# Patient Record
Sex: MALE | Race: OTHER
[De-identification: names, ages, dates, MRNs, and addresses within clinical notes are randomized per-mention and may not be internally consistent; named-entity substitution may affect disease eponyms.]

---

## 2017-08-05 ENCOUNTER — HOSPITAL ENCOUNTER (EMERGENCY)
Dept: HOSPITAL 15 - ER | Age: 52
Discharge: LEFT BEFORE DISCHARGE | End: 2017-08-05
Payer: MEDICAID

## 2017-08-05 VITALS — WEIGHT: 185 LBS | HEIGHT: 65 IN | BODY MASS INDEX: 30.82 KG/M2

## 2017-08-05 VITALS — SYSTOLIC BLOOD PRESSURE: 143 MMHG | DIASTOLIC BLOOD PRESSURE: 90 MMHG

## 2017-08-05 DIAGNOSIS — E11.65: ICD-10-CM

## 2017-08-05 DIAGNOSIS — I25.10: Primary | ICD-10-CM

## 2017-08-05 DIAGNOSIS — I10: ICD-10-CM

## 2017-08-05 LAB
ALBUMIN SERPL-MCNC: 3.3 G/DL (ref 3.4–5)
ALP SERPL-CCNC: 154 U/L (ref 45–117)
ANION GAP SERPL CALCULATED.3IONS-SCNC: 10 MMOL/L (ref 5–15)
BILIRUB SERPL-MCNC: 0.3 MG/DL (ref 0.2–1)
BUN SERPL-MCNC: 13 MG/DL (ref 7–18)
BUN/CREAT SERPL: 17.1
CALCIUM SERPL-MCNC: 8.9 MG/DL (ref 8.5–10.1)
CELLS COUNTED: 100
CHLORIDE SERPL-SCNC: 106 MMOL/L (ref 98–107)
CO2 SERPL-SCNC: 19 MMOL/L (ref 21–32)
GLUCOSE SERPL-MCNC: 160 MG/DL (ref 74–106)
HCT VFR BLD AUTO: 40.2 % (ref 41–53)
HGB BLD-MCNC: 13.9 G/DL (ref 13.5–17.5)
MAGNESIUM SERPL-MCNC: 2.2 MG/DL (ref 1.6–2.6)
MCH RBC QN AUTO: 30.3 PG (ref 28–32)
MCV RBC AUTO: 87.4 FL (ref 80–100)
POTASSIUM SERPL-SCNC: 4.2 MMOL/L (ref 3.5–5.1)
PROT SERPL-MCNC: 8.8 G/DL (ref 6.4–8.2)
SODIUM SERPL-SCNC: 135 MMOL/L (ref 136–145)
SUSPECT: (no result)

## 2017-08-05 PROCEDURE — 84484 ASSAY OF TROPONIN QUANT: CPT

## 2017-08-05 PROCEDURE — 71020: CPT

## 2017-08-05 PROCEDURE — 83690 ASSAY OF LIPASE: CPT

## 2017-08-05 PROCEDURE — 83735 ASSAY OF MAGNESIUM: CPT

## 2017-08-05 PROCEDURE — 85007 BL SMEAR W/DIFF WBC COUNT: CPT

## 2017-08-05 PROCEDURE — 94761 N-INVAS EAR/PLS OXIMETRY MLT: CPT

## 2017-08-05 PROCEDURE — 93005 ELECTROCARDIOGRAM TRACING: CPT

## 2017-08-05 PROCEDURE — 85027 COMPLETE CBC AUTOMATED: CPT

## 2017-08-05 PROCEDURE — 36415 COLL VENOUS BLD VENIPUNCTURE: CPT

## 2017-08-05 PROCEDURE — 80053 COMPREHEN METABOLIC PANEL: CPT

## 2017-08-05 PROCEDURE — 84443 ASSAY THYROID STIM HORMONE: CPT

## 2019-04-15 ENCOUNTER — HOSPITAL ENCOUNTER (EMERGENCY)
Dept: HOSPITAL 15 - ER | Age: 54
Discharge: HOME | End: 2019-04-15
Payer: COMMERCIAL

## 2019-04-15 VITALS — BODY MASS INDEX: 34.99 KG/M2 | HEIGHT: 65 IN | WEIGHT: 210 LBS

## 2019-04-15 VITALS — DIASTOLIC BLOOD PRESSURE: 75 MMHG | SYSTOLIC BLOOD PRESSURE: 127 MMHG

## 2019-04-15 DIAGNOSIS — M79.18: ICD-10-CM

## 2019-04-15 DIAGNOSIS — I10: ICD-10-CM

## 2019-04-15 DIAGNOSIS — E11.9: ICD-10-CM

## 2019-04-15 DIAGNOSIS — R10.11: Primary | ICD-10-CM

## 2019-04-15 LAB
ALBUMIN SERPL-MCNC: 3.6 G/DL (ref 3.4–5)
ALP SERPL-CCNC: 103 U/L (ref 45–117)
ALT SERPL-CCNC: 25 U/L (ref 16–61)
ANION GAP SERPL CALCULATED.3IONS-SCNC: 8 MMOL/L (ref 5–15)
BILIRUB SERPL-MCNC: 0.3 MG/DL (ref 0.2–1)
BUN SERPL-MCNC: 11 MG/DL (ref 7–18)
BUN/CREAT SERPL: 14.1
CALCIUM SERPL-MCNC: 9 MG/DL (ref 8.5–10.1)
CHLORIDE SERPL-SCNC: 103 MMOL/L (ref 98–107)
CO2 SERPL-SCNC: 25 MMOL/L (ref 21–32)
GLUCOSE SERPL-MCNC: 182 MG/DL (ref 74–106)
HCT VFR BLD AUTO: 40.5 % (ref 41–53)
HGB BLD-MCNC: 13.4 G/DL (ref 13.5–17.5)
MCH RBC QN AUTO: 28.5 PG (ref 28–32)
MCV RBC AUTO: 85.9 FL (ref 80–100)
NRBC BLD QL AUTO: 0.1 %
POTASSIUM SERPL-SCNC: 3.5 MMOL/L (ref 3.5–5.1)
PROT SERPL-MCNC: 7.8 G/DL (ref 6.4–8.2)
SODIUM SERPL-SCNC: 136 MMOL/L (ref 136–145)

## 2019-04-15 PROCEDURE — 81001 URINALYSIS AUTO W/SCOPE: CPT

## 2019-04-15 PROCEDURE — 84484 ASSAY OF TROPONIN QUANT: CPT

## 2019-04-15 PROCEDURE — 36415 COLL VENOUS BLD VENIPUNCTURE: CPT

## 2019-04-15 PROCEDURE — 85025 COMPLETE CBC W/AUTO DIFF WBC: CPT

## 2019-04-15 PROCEDURE — 80053 COMPREHEN METABOLIC PANEL: CPT

## 2019-04-15 PROCEDURE — 74176 CT ABD & PELVIS W/O CONTRAST: CPT

## 2019-04-15 PROCEDURE — 93005 ELECTROCARDIOGRAM TRACING: CPT

## 2019-08-24 ENCOUNTER — HOSPITAL ENCOUNTER (EMERGENCY)
Dept: HOSPITAL 15 - ER | Age: 54
LOS: 1 days | Discharge: LEFT BEFORE DISCHARGE | End: 2019-08-25
Payer: COMMERCIAL

## 2019-08-24 VITALS — HEIGHT: 65 IN | WEIGHT: 210 LBS | BODY MASS INDEX: 34.99 KG/M2

## 2019-08-24 VITALS — DIASTOLIC BLOOD PRESSURE: 57 MMHG | SYSTOLIC BLOOD PRESSURE: 118 MMHG

## 2019-08-24 DIAGNOSIS — R07.89: Primary | ICD-10-CM

## 2019-08-24 DIAGNOSIS — F41.9: ICD-10-CM

## 2019-08-24 DIAGNOSIS — Z53.21: ICD-10-CM

## 2019-08-24 PROCEDURE — 93005 ELECTROCARDIOGRAM TRACING: CPT

## 2020-01-09 ENCOUNTER — HOSPITAL ENCOUNTER (EMERGENCY)
Dept: HOSPITAL 15 - ER | Age: 55
Discharge: HOME | End: 2020-01-09
Payer: COMMERCIAL

## 2020-01-09 VITALS — HEIGHT: 65 IN | WEIGHT: 211 LBS | BODY MASS INDEX: 35.16 KG/M2

## 2020-01-09 VITALS — SYSTOLIC BLOOD PRESSURE: 144 MMHG | DIASTOLIC BLOOD PRESSURE: 71 MMHG

## 2020-01-09 DIAGNOSIS — M54.42: Primary | ICD-10-CM

## 2020-01-09 DIAGNOSIS — E11.9: ICD-10-CM

## 2020-01-09 DIAGNOSIS — I10: ICD-10-CM

## 2020-01-09 PROCEDURE — 99283 EMERGENCY DEPT VISIT LOW MDM: CPT

## 2020-01-09 PROCEDURE — 96372 THER/PROPH/DIAG INJ SC/IM: CPT

## 2020-01-09 PROCEDURE — 82962 GLUCOSE BLOOD TEST: CPT

## 2020-01-09 PROCEDURE — 73502 X-RAY EXAM HIP UNI 2-3 VIEWS: CPT

## 2020-01-12 ENCOUNTER — HOSPITAL ENCOUNTER (EMERGENCY)
Dept: HOSPITAL 15 - ER | Age: 55
Discharge: HOME | End: 2020-01-12
Payer: COMMERCIAL

## 2020-01-12 VITALS — DIASTOLIC BLOOD PRESSURE: 69 MMHG | SYSTOLIC BLOOD PRESSURE: 136 MMHG

## 2020-01-12 VITALS — WEIGHT: 211 LBS | BODY MASS INDEX: 35.16 KG/M2 | HEIGHT: 65 IN

## 2020-01-12 DIAGNOSIS — E11.9: ICD-10-CM

## 2020-01-12 DIAGNOSIS — I10: ICD-10-CM

## 2020-01-12 DIAGNOSIS — M54.16: Primary | ICD-10-CM

## 2021-08-13 ENCOUNTER — HOSPITAL ENCOUNTER (EMERGENCY)
Dept: HOSPITAL 15 - ER | Age: 56
Discharge: HOME | End: 2021-08-13
Payer: COMMERCIAL

## 2021-08-13 VITALS — SYSTOLIC BLOOD PRESSURE: 136 MMHG | DIASTOLIC BLOOD PRESSURE: 63 MMHG

## 2021-08-13 VITALS — WEIGHT: 210 LBS | BODY MASS INDEX: 34.99 KG/M2 | HEIGHT: 65 IN
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
## 2021-08-13 DIAGNOSIS — E11.9: ICD-10-CM

## 2021-08-13 DIAGNOSIS — I10: ICD-10-CM
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
## 2021-08-13 DIAGNOSIS — Z90.49: ICD-10-CM

## 2021-08-13 DIAGNOSIS — S92.341D: ICD-10-CM
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
## 2021-08-13 DIAGNOSIS — Z79.84: ICD-10-CM

## 2021-08-13 DIAGNOSIS — Z79.899: ICD-10-CM

## 2021-08-13 DIAGNOSIS — S90.32XA: Primary | ICD-10-CM

## 2021-08-13 PROCEDURE — 73620 X-RAY EXAM OF FOOT: CPT

## 2021-10-17 ENCOUNTER — HOSPITAL ENCOUNTER (EMERGENCY)
Dept: HOSPITAL 15 - ER | Age: 56
Discharge: HOME | End: 2021-10-17
Payer: COMMERCIAL

## 2021-10-17 VITALS — DIASTOLIC BLOOD PRESSURE: 70 MMHG | SYSTOLIC BLOOD PRESSURE: 126 MMHG

## 2021-10-17 VITALS — HEIGHT: 65 IN | WEIGHT: 205 LBS | BODY MASS INDEX: 34.16 KG/M2

## 2021-10-17 DIAGNOSIS — Z90.49: ICD-10-CM

## 2021-10-17 DIAGNOSIS — M79.671: ICD-10-CM

## 2021-10-17 DIAGNOSIS — E66.9: ICD-10-CM
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
## 2021-10-17 DIAGNOSIS — Z79.899: ICD-10-CM

## 2021-10-17 DIAGNOSIS — I10: ICD-10-CM
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
## 2021-10-17 DIAGNOSIS — E11.9: ICD-10-CM
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
## 2021-10-17 DIAGNOSIS — S92.334A: Primary | ICD-10-CM

## 2021-10-17 PROCEDURE — 73630 X-RAY EXAM OF FOOT: CPT

## 2022-08-18 ENCOUNTER — HOSPITAL ENCOUNTER (EMERGENCY)
Dept: HOSPITAL 15 - ER | Age: 57
Discharge: LEFT BEFORE DISCHARGE | End: 2022-08-18
Payer: COMMERCIAL

## 2022-08-18 VITALS — HEIGHT: 65 IN | BODY MASS INDEX: 30.85 KG/M2 | WEIGHT: 185.19 LBS

## 2022-08-18 VITALS — DIASTOLIC BLOOD PRESSURE: 78 MMHG | SYSTOLIC BLOOD PRESSURE: 151 MMHG

## 2022-08-18 DIAGNOSIS — Z90.49: ICD-10-CM

## 2022-08-18 DIAGNOSIS — R10.84: Primary | ICD-10-CM

## 2022-08-18 DIAGNOSIS — E11.9: ICD-10-CM

## 2022-08-18 DIAGNOSIS — I10: ICD-10-CM

## 2022-08-18 LAB
ALBUMIN SERPL-MCNC: 3.8 G/DL (ref 3.4–5)
ALP SERPL-CCNC: 82 U/L (ref 45–117)
ALT SERPL-CCNC: 22 U/L (ref 16–61)
AMYLASE SERPL-CCNC: 62 U/L (ref 25–115)
ANION GAP SERPL CALCULATED.3IONS-SCNC: 9 MMOL/L (ref 5–15)
BILIRUB SERPL-MCNC: 0.6 MG/DL (ref 0.2–1)
BUN SERPL-MCNC: 22 MG/DL (ref 7–18)
BUN/CREAT SERPL: 14.5
CALCIUM SERPL-MCNC: 9.8 MG/DL (ref 8.5–10.1)
CHLORIDE SERPL-SCNC: 105 MMOL/L (ref 98–107)
CO2 SERPL-SCNC: 21 MMOL/L (ref 21–32)
GLUCOSE SERPL-MCNC: 237 MG/DL (ref 74–106)
HCT VFR BLD AUTO: 41.2 % (ref 41–53)
HGB BLD-MCNC: 13.3 G/DL (ref 13.5–17.5)
LIPASE SERPL-CCNC: 96 U/L (ref 73–393)
MCH RBC QN AUTO: 28.1 PG (ref 28–32)
MCV RBC AUTO: 87 FL (ref 80–100)
NRBC BLD QL AUTO: 0 %
POTASSIUM SERPL-SCNC: 4.4 MMOL/L (ref 3.5–5.1)
PROT SERPL-MCNC: 7.8 G/DL (ref 6.4–8.2)
SODIUM SERPL-SCNC: 135 MMOL/L (ref 136–145)

## 2022-08-18 PROCEDURE — 85025 COMPLETE CBC W/AUTO DIFF WBC: CPT

## 2022-08-18 PROCEDURE — 36415 COLL VENOUS BLD VENIPUNCTURE: CPT

## 2022-08-18 PROCEDURE — 74176 CT ABD & PELVIS W/O CONTRAST: CPT

## 2022-08-18 PROCEDURE — 80053 COMPREHEN METABOLIC PANEL: CPT

## 2022-08-18 PROCEDURE — 93005 ELECTROCARDIOGRAM TRACING: CPT

## 2022-08-18 PROCEDURE — 82150 ASSAY OF AMYLASE: CPT

## 2022-08-18 PROCEDURE — 83690 ASSAY OF LIPASE: CPT

## 2023-05-13 ENCOUNTER — HOSPITAL ENCOUNTER (EMERGENCY)
Dept: HOSPITAL 15 - ER | Age: 58
Discharge: HOME | End: 2023-05-13
Payer: COMMERCIAL

## 2023-05-13 VITALS — SYSTOLIC BLOOD PRESSURE: 132 MMHG | DIASTOLIC BLOOD PRESSURE: 46 MMHG

## 2023-05-13 VITALS — WEIGHT: 184.53 LBS | BODY MASS INDEX: 30.74 KG/M2 | HEIGHT: 65 IN

## 2023-05-13 DIAGNOSIS — E11.9: ICD-10-CM

## 2023-05-13 DIAGNOSIS — R07.81: Primary | ICD-10-CM

## 2023-05-13 DIAGNOSIS — I10: ICD-10-CM
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
## 2023-05-13 DIAGNOSIS — Z93.3: ICD-10-CM
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
## 2023-05-13 DIAGNOSIS — Z90.49: ICD-10-CM

## 2023-05-13 DIAGNOSIS — R06.02: ICD-10-CM

## 2023-05-13 LAB
ALBUMIN SERPL-MCNC: 3.7 G/DL (ref 3.4–5)
ALP SERPL-CCNC: 68 U/L (ref 45–117)
ALT SERPL-CCNC: 20 U/L (ref 16–61)
ANION GAP SERPL CALCULATED.3IONS-SCNC: 5 MMOL/L (ref 5–15)
BILIRUB SERPL-MCNC: 0.6 MG/DL (ref 0.2–1)
BUN SERPL-MCNC: 15 MG/DL (ref 7–18)
BUN/CREAT SERPL: 18.5 (ref 10–20)
CALCIUM SERPL-MCNC: 9 MG/DL (ref 8.5–10.1)
CHLORIDE SERPL-SCNC: 108 MMOL/L (ref 98–107)
CO2 SERPL-SCNC: 24 MMOL/L (ref 21–32)
GLUCOSE SERPL-MCNC: 125 MG/DL (ref 74–106)
HCT VFR BLD AUTO: 38.9 % (ref 41–53)
HGB BLD-MCNC: 13 G/DL (ref 13.5–17.5)
MCH RBC QN AUTO: 28.8 PG (ref 28–32)
MCV RBC AUTO: 86.4 FL (ref 80–100)
NRBC BLD QL AUTO: 0.3 %
POTASSIUM SERPL-SCNC: 3.6 MMOL/L (ref 3.5–5.1)
PROT SERPL-MCNC: 7.3 G/DL (ref 6.4–8.2)
SODIUM SERPL-SCNC: 137 MMOL/L (ref 136–145)

## 2023-05-13 PROCEDURE — 74176 CT ABD & PELVIS W/O CONTRAST: CPT

## 2023-05-13 PROCEDURE — 72125 CT NECK SPINE W/O DYE: CPT

## 2023-05-13 PROCEDURE — 71250 CT THORAX DX C-: CPT

## 2023-05-13 PROCEDURE — 36415 COLL VENOUS BLD VENIPUNCTURE: CPT

## 2023-05-13 PROCEDURE — 70450 CT HEAD/BRAIN W/O DYE: CPT

## 2023-05-13 PROCEDURE — 85025 COMPLETE CBC W/AUTO DIFF WBC: CPT

## 2023-05-13 PROCEDURE — 80053 COMPREHEN METABOLIC PANEL: CPT

## 2023-05-13 PROCEDURE — 82962 GLUCOSE BLOOD TEST: CPT

## 2023-05-13 PROCEDURE — 81001 URINALYSIS AUTO W/SCOPE: CPT

## 2023-07-02 ENCOUNTER — HOSPITAL ENCOUNTER (EMERGENCY)
Dept: HOSPITAL 15 - ER | Age: 58
Discharge: LEFT BEFORE DISCHARGE | End: 2023-07-02
Payer: COMMERCIAL

## 2023-07-02 VITALS — HEIGHT: 65 IN | WEIGHT: 187.39 LBS | BODY MASS INDEX: 31.22 KG/M2

## 2023-07-02 VITALS — DIASTOLIC BLOOD PRESSURE: 67 MMHG | SYSTOLIC BLOOD PRESSURE: 137 MMHG

## 2023-07-02 DIAGNOSIS — R06.02: ICD-10-CM

## 2023-07-02 DIAGNOSIS — Z79.899: ICD-10-CM

## 2023-07-02 DIAGNOSIS — Z79.01: ICD-10-CM

## 2023-07-02 DIAGNOSIS — Z53.21: ICD-10-CM

## 2023-07-02 DIAGNOSIS — R07.89: Primary | ICD-10-CM

## 2023-07-02 LAB
ALBUMIN SERPL-MCNC: 3.9 G/DL (ref 3.4–5)
ALCOHOL, URINE: < 3 MG/DL (ref 0–10)
ALP SERPL-CCNC: 81 U/L (ref 45–117)
ALT SERPL-CCNC: 22 U/L (ref 16–61)
AMPHETAMINES UR QL SCN: NEGATIVE
ANION GAP SERPL CALCULATED.3IONS-SCNC: 10 MMOL/L (ref 5–15)
APTT PPP: 30.5 SEC (ref 24.5–34.5)
BARBITURATES UR QL SCN: NEGATIVE
BENZODIAZ UR QL SCN: NEGATIVE
BILIRUB SERPL-MCNC: 0.6 MG/DL (ref 0.2–1)
BUN SERPL-MCNC: 20 MG/DL (ref 7–18)
BUN/CREAT SERPL: 16.1 (ref 10–20)
BZE UR QL SCN: NEGATIVE
CALCIUM SERPL-MCNC: 9.3 MG/DL (ref 8.5–10.1)
CANNABINOIDS UR QL SCN: NEGATIVE
CHLORIDE SERPL-SCNC: 106 MMOL/L (ref 98–107)
CO2 SERPL-SCNC: 20 MMOL/L (ref 21–32)
GLUCOSE SERPL-MCNC: 265 MG/DL (ref 74–106)
HCT VFR BLD AUTO: 41.4 % (ref 41–53)
HGB BLD-MCNC: 13.6 G/DL (ref 13.5–17.5)
INR PPP: 0.98 (ref 0.9–1.15)
MAGNESIUM SERPL-MCNC: 2.4 MG/DL (ref 1.6–2.6)
MCH RBC QN AUTO: 28.8 PG (ref 28–32)
MCV RBC AUTO: 87.4 FL (ref 80–100)
NRBC BLD QL AUTO: 0.2 %
OPIATES UR QL SCN: NEGATIVE
PCP UR QL SCN: NEGATIVE
POTASSIUM SERPL-SCNC: 4.3 MMOL/L (ref 3.5–5.1)
PROT SERPL-MCNC: 7.3 G/DL (ref 6.4–8.2)
SODIUM SERPL-SCNC: 136 MMOL/L (ref 136–145)

## 2023-07-02 PROCEDURE — 84484 ASSAY OF TROPONIN QUANT: CPT

## 2023-07-02 PROCEDURE — 85025 COMPLETE CBC W/AUTO DIFF WBC: CPT

## 2023-07-02 PROCEDURE — 83735 ASSAY OF MAGNESIUM: CPT

## 2023-07-02 PROCEDURE — 85730 THROMBOPLASTIN TIME PARTIAL: CPT

## 2023-07-02 PROCEDURE — 36415 COLL VENOUS BLD VENIPUNCTURE: CPT

## 2023-07-02 PROCEDURE — 71045 X-RAY EXAM CHEST 1 VIEW: CPT

## 2023-07-02 PROCEDURE — 83880 ASSAY OF NATRIURETIC PEPTIDE: CPT

## 2023-07-02 PROCEDURE — 80053 COMPREHEN METABOLIC PANEL: CPT

## 2023-07-02 PROCEDURE — 80307 DRUG TEST PRSMV CHEM ANLYZR: CPT

## 2023-07-02 PROCEDURE — 93005 ELECTROCARDIOGRAM TRACING: CPT

## 2023-07-02 PROCEDURE — 85610 PROTHROMBIN TIME: CPT

## 2023-07-02 PROCEDURE — 81001 URINALYSIS AUTO W/SCOPE: CPT

## 2025-01-23 ENCOUNTER — HOSPITAL ENCOUNTER (INPATIENT)
Dept: HOSPITAL 15 - ER | Age: 60
LOS: 11 days | Discharge: HOME HEALTH | DRG: 853 | End: 2025-02-03
Attending: INTERNAL MEDICINE | Admitting: INTERNAL MEDICINE
Payer: COMMERCIAL

## 2025-01-23 VITALS — WEIGHT: 179.46 LBS | HEIGHT: 65 IN | BODY MASS INDEX: 29.9 KG/M2

## 2025-01-23 DIAGNOSIS — L03.311: ICD-10-CM

## 2025-01-23 DIAGNOSIS — E87.6: ICD-10-CM

## 2025-01-23 DIAGNOSIS — D64.9: ICD-10-CM

## 2025-01-23 DIAGNOSIS — K63.1: ICD-10-CM

## 2025-01-23 DIAGNOSIS — A41.51: Primary | ICD-10-CM

## 2025-01-23 DIAGNOSIS — E11.65: ICD-10-CM

## 2025-01-23 DIAGNOSIS — Z93.3: ICD-10-CM

## 2025-01-23 DIAGNOSIS — Z79.899: ICD-10-CM

## 2025-01-23 DIAGNOSIS — E66.01: ICD-10-CM

## 2025-01-23 DIAGNOSIS — K59.39: ICD-10-CM

## 2025-01-23 DIAGNOSIS — I11.9: ICD-10-CM

## 2025-01-23 DIAGNOSIS — Z90.49: ICD-10-CM

## 2025-01-23 DIAGNOSIS — K55.049: ICD-10-CM

## 2025-01-23 DIAGNOSIS — Z85.038: ICD-10-CM

## 2025-01-23 DIAGNOSIS — N17.0: ICD-10-CM

## 2025-01-23 DIAGNOSIS — E87.1: ICD-10-CM

## 2025-01-23 DIAGNOSIS — Z85.048: ICD-10-CM

## 2025-01-23 PROCEDURE — 84478 ASSAY OF TRIGLYCERIDES: CPT

## 2025-01-23 PROCEDURE — 71045 X-RAY EXAM CHEST 1 VIEW: CPT

## 2025-01-23 PROCEDURE — 87075 CULTR BACTERIA EXCEPT BLOOD: CPT

## 2025-01-23 PROCEDURE — 85610 PROTHROMBIN TIME: CPT

## 2025-01-23 PROCEDURE — 80053 COMPREHEN METABOLIC PANEL: CPT

## 2025-01-23 PROCEDURE — 82040 ASSAY OF SERUM ALBUMIN: CPT

## 2025-01-23 PROCEDURE — 87040 BLOOD CULTURE FOR BACTERIA: CPT

## 2025-01-23 PROCEDURE — 97110 THERAPEUTIC EXERCISES: CPT

## 2025-01-23 PROCEDURE — 83690 ASSAY OF LIPASE: CPT

## 2025-01-23 PROCEDURE — 83605 ASSAY OF LACTIC ACID: CPT

## 2025-01-23 PROCEDURE — 97163 PT EVAL HIGH COMPLEX 45 MIN: CPT

## 2025-01-23 PROCEDURE — 87186 SC STD MICRODIL/AGAR DIL: CPT

## 2025-01-23 PROCEDURE — 36415 COLL VENOUS BLD VENIPUNCTURE: CPT

## 2025-01-23 PROCEDURE — 97116 GAIT TRAINING THERAPY: CPT

## 2025-01-23 PROCEDURE — 87076 CULTURE ANAEROBE IDENT EACH: CPT

## 2025-01-23 PROCEDURE — 97530 THERAPEUTIC ACTIVITIES: CPT

## 2025-01-23 PROCEDURE — 87205 SMEAR GRAM STAIN: CPT

## 2025-01-23 PROCEDURE — 83735 ASSAY OF MAGNESIUM: CPT

## 2025-01-23 PROCEDURE — 85730 THROMBOPLASTIN TIME PARTIAL: CPT

## 2025-01-23 PROCEDURE — 83036 HEMOGLOBIN GLYCOSYLATED A1C: CPT

## 2025-01-23 PROCEDURE — 82962 GLUCOSE BLOOD TEST: CPT

## 2025-01-23 PROCEDURE — 86850 RBC ANTIBODY SCREEN: CPT

## 2025-01-23 PROCEDURE — 86901 BLOOD TYPING SEROLOGIC RH(D): CPT

## 2025-01-23 PROCEDURE — 74177 CT ABD & PELVIS W/CONTRAST: CPT

## 2025-01-23 PROCEDURE — 87070 CULTURE OTHR SPECIMN AEROBIC: CPT

## 2025-01-23 PROCEDURE — 86900 BLOOD TYPING SEROLOGIC ABO: CPT

## 2025-01-23 PROCEDURE — 80048 BASIC METABOLIC PNL TOTAL CA: CPT

## 2025-01-23 PROCEDURE — 87077 CULTURE AEROBIC IDENTIFY: CPT

## 2025-01-23 PROCEDURE — 85025 COMPLETE CBC W/AUTO DIFF WBC: CPT

## 2025-01-23 PROCEDURE — 99291 CRITICAL CARE FIRST HOUR: CPT

## 2025-01-23 PROCEDURE — 84100 ASSAY OF PHOSPHORUS: CPT

## 2025-01-23 NOTE — ED.PDOC
GI ASSESSMENT


HPI Comments


59-year-old male came to emergency room for abdominal pain.  Patient the past 10

days has been having diffuse abdominal pain associated with loss of appetite, 

with nausea and vomiting. Noted also abdominal distention. He does have history 

of hypertension, diabetes, colon cancer on remission, status post ostomy bag. 

Complaining also of polydipsia and polyuria. Persistence of abdominal pain, 

distention, with generalized weakness and pale appearance prompted check up.


Chief Complaint:  Abdominal Pain


Time Seen by MD:  23:38


Primary Care Provider:  CLARISSA


Reviewed Notes:  Nurses Notes


Allergies:  


Coded Allergies:  


     NO KNOWN ALLERGIES (Unverified , 3/6/14)


Home Meds


Active Scripts


Pantoprazole Sodium Sesquihydr (Protonix) 40 Mg Tab, 40 MG PO DAILY, #30 TAB


   Prov:CECILE YODER MD         8/18/22


Ondansetron (Zofran) 4 Mg Tab, 1 TAB PO Q6HR, #20 TAB


   Prov:CECILE YODER MD         8/18/22


Reported Medications


Gabapentin (Gabapentin) 100 Mg Cap, 100 MG PO, CAP


   3/6/14


Glipizide (Glipizide) 5 Mg Tab, 5 MG PO, TAB


   3/6/14


Metformin Hydrochloride (Metformin Hcl) 500 Mg Tab, 500 MG PO, TAB


   3/6/14


Enalapril Maleate (VASOTEC TABLET) 2.5 Mg Tb, 2.5 MG GT


   3/6/14


Information Source:  Patient


Mode of Arrival:  Ambulatory


Timing:  Days


Duration:  Since onset


Prehospital treatment:  None


Quality:  Aching


Vomitus:  Watery


Stool:  Normal


Severity:  Moderate


Recent:  None


Recent Hx of:  Abdominal Surgery, Other (Colon cancer)


Pain Location:  Diffuse


Associated sign and symptoms:  Nausea, Vomiting, Abdominal Pain, Anorexia





Past Medical History


PAST MEDICAL HISTORY:  Cancer, DM, HTN


Surgical History:  Cholecystectomy


Surgical History (Other):  


Ostomy





Family History


Family History:  Reviewed,noncontributory to illness





Social History


Smoker:  Non-Smoker


Alcohol:  Denies ETOH Use


Drugs:  Denies Drug Use


Lives In:  Home





Constitutional:  reports: fatigue, weakness; denies: chills, diaphoresis, fever,

malaise, sweats, others


EENTM:  denies: blurred vision, double vision, ear bleeding, ear discharge, ear 

drainage, ear pain, ear ringing, eye pain, eye redness, hearing loss, mouth 

pain, mouth swelling, nasal discharge, nose bleeding, nose congestion, nose 

pain, photophobia, tearing, throat pain, throat swelling, voice changes, others


Respiratory:  denies: cough, hemoptysis, orthopnea, SOB at rest, shortness of 

breath, SOB with excertion, stridor, wheezing, others


Cardiovascular:  denies: chest pain, dizzy spells, diaphoresis, Dyspnea on 

exertion, edema, irregular heart beat, left arm pain, lightheadedness, 

palpitations, PND, syncope, others


Gastrointestinal:  reports: abdomen distended, abdominal pain, nausea, poor 

appetite, vomiting; denies: blood streaked bowels, constipated, diarrhea, 

dysphagia, difficulty swallowing, hematemesis, melena, poor fluid intake, rectal

bleeding, rectal pain, others


Genitourinary:  denies: burning, dysuria, flank pain, frequency, hematuria, 

incontinence, penile discharge, penile sore, pain, testicle pain, testicle 

swelling, urgency, others


Neurological:  denies: dizziness, fainting, headache, left sided numbness, left 

sided weakness, numbness, paresthesia, pre-existing deficit, right sided 

numbness, right sided weakness, seizure, speech problems, tingling, tremors, 

weakness, others


Musculoskeletal:  denies: back pain, gout, joint pain, joint swelling, muscle 

pain, muscle stiffness, neck pain, others


Integumetry:  denies: bruises, change in color, change in hair/nails, dryness, 

laceration, lesions, lumps, rash, wounds, others


Allergic/Immunocompromised:  denies: Difficulty Healing, Frequent Infections, 

Hives, Itching, others


Hematologic/Lymphatic:  denies: anemia, blood clots, easy bleeding, easy 

bruising, swollen glands, others


Endocrine:  denies: excessive hunger, excessive sweating, excessive thirst, 

excessive urination, flushing, intolerance to cold, intolerance to heat, 

unexplained weight gain, unexplained weight loss, others


Psychiatric:  denies: anxiety, bipolar disorder, depression, hopeless, panic 

disorder, schizophrenia, sleepless, suicidal, others





Physical Exam


General Appearance:  No Apparent Distress, Normal


HEENT:  Normal ENT Inspection, Pharynx Normal, TMs Normal


Neck:  Full Range of Motion, Non-Tender, Normal, Normal Inspection


Respiratory:  Chest Non-Tender, Lungs Clear, No Accessory Muscle Use, No 

Respiratory Distress, Normal Breath Sounds


Cardiovascular:  No Edema, No JVD, No Murmur, No Gallop, Normal Peripheral 

Pulses, Regular Rate/Rhythm


Breast Exam:  Deferred


Gastrointestinal:  Diffuse, No Organomegaly, No Pulsatile Mass, Normal Bowel 

Sounds, Soft, Tenderness, Other (Ostomy bag), NOT DONE


Genitalia:  Deferred


Pelvic:  Deferred


Rectal:  Deferred


Extremities:  No calf tenderness, Normal capillary refill, Normal inspection, 

Normal range of motion, Non-tender, No pedal edema


Musculoskeletal :  


   Apperance:  Normal


Neurologic:  Alert, CNs II-XII nml as Tested, No Motor Deficits, Normal Affect, 

Normal Mood, No Sensory Deficits


Cerebellar Function:  Normal


Reflexes:  Normal


Skin:  Dry, Normal Color, Warm


Lymphatic:  No Adenopathy





Was a procedure done?


Was a procedure done?:  No





GI differential Dx


Differential Diagnosis:  Bowel Obstruction, Constipation, Diverticular disease, 

Gastritis/PUD, Gastroenteritis, Hernia, Inflammatory BD, Ischemic Bowel, 

Pancreatitis, UTI, Urolithiasis, Electrolyte Imbalance, Mass, Anemia, Stress 

Ulcer





X-Ray, Labs, Meds, VS





                                   Vital Signs








  Date Time  Temp Pulse Resp B/P (MAP) Pulse Ox O2 Delivery O2 Flow Rate FiO2


 


1/24/25 01:56  97 20  97 Room Air  


 


1/24/25 01:45 98.2 97 16 120/72 (88) 97   





 98.2       


 


1/23/25 23:28 99.1 111 16 128/71 (90) 95   








                                       Lab








Test


 1/24/25


01:51 1/23/25


23:40 Range/Units


 


 


POC Glucose 534 *H    mg/dl


 


White Blood Count


 


 11.3 H


 4.4-10.8


10^3/uL


 


Red Blood Count


 


 4.16 L


 4.5-5.90


10^6/uL


 


Hemoglobin  12.0 L 13.5-17.5  g/dL


 


Hematocrit  35.5 L 41.0-53.0  %


 


Mean Corpuscular Volume  85.3  80.0-100.0  fL


 


Mean Corpuscular Hemoglobin  28.9  28.0-32.0  pg


 


Mean Corpuscular Hemoglobin


Concent 


 33.8 


 32.0-36.0  g/dL





 


Red Cell Distribution Width  13.8  11.8-14.3  %


 


Platelet Count


 


 358 


 140-450


10^3/uL


 


Mean Platelet Volume  7.2  6.9-10.8  fL


 


Neutrophils (%) (Auto)  91.2 H 37.0-80.0  %


 


Lymphocytes (%) (Auto)  4.1 L 10.0-50.0  %


 


Monocytes (%) (Auto)  4.4  0.0-12.0  %


 


Eosinophils (%) (Auto)  0.1  0.0-7.0  %


 


Basophils (%) (Auto)  0.2  0.0-2.0  %


 


Neutrophils # (Auto)


 


 10.3 H


 1.6-8.6  10


^3/uL


 


Lymphocytes # (Auto)


 


 0.5 


 0.4-5.4  10


^3/uL


 


Monocytes # (Auto)  0.5  0-1.3  10 ^3/uL


 


Eosinophils # (Auto)  0  0-0.8  10 ^3/uL


 


Basophils # (Auto)  0  0-0.2  10 ^3/uL


 


Nucleated Red Blood Cells  0.0   %


 


Sodium Level  127 L 136-145  mmol/L


 


Potassium Level  3.1 L 3.5-5.1  mmol/L


 


Chloride Level  96 L   mmol/L


 


Carbon Dioxide Level  19 L 20-31  mmol/L


 


Anion Gap  12  5-15  


 


Blood Urea Nitrogen  11  9-23  mg/dL


 


Creatinine


 


 1.17 


 0.700-1.30


mg/dL


 


Glomerular Filtration Rate


Calc 


 72 


 >90  mL/min





 


BUN/Creatinine Ratio  9.4 L 10.0-20.0  


 


Serum Glucose  644 *H   mg/dL


 


Calcium Level  9.7  8.7-10.4  mg/dL


 


Total Bilirubin  0.6  0.2-1.0  mg/dL


 


Aspartate Amino Transferase


(AST) 


 12 L


 13-40  U/L





 


Alanine Aminotransferase (ALT)  12  7-40  U/L


 


Alkaline Phosphatase  140 H   U/L


 


Total Protein  7.1  5.7-8.2  g/dL


 


Albumin  4.1  3.2-4.8  g/dL


 


Lipase  32  12-53  U/L








                               Current Medications








 Medications


  (Trade)  Dose


 Ordered  Sig/Jennifer


 Route  Start Time


 Stop Time Status Last Admin


 


 Insulin Human


 Regular


  (InsuLIN R)  10 units  ONCE  ONCE


 IV  1/24/25 01:15


 1/24/25 01:17 DC 1/24/25 01:58











Time of 1ST Reevaluation:  23:28


Reevaluation 1ST:  Unchanged


Patient Education/Counseling:  Diagnosis, Treatment


Family Education/Counseling:  No Family Present





Departure 1


Departure


Time of Disposition:  03:31 (Patient presented with abdominal pain that was 

concerning for possible appendicits, gastritis, cholecystitis, colitis, 

gastroenteritis, sbo, or orther possible surgical emergency. Data: 1. I ordered 

and reviewed the result of at least 3 labs including a CBC, BMP, and Urinalysis.

2. I independently interpreted the following tests: CT Abdoment and Pelvis is 

concerning for perforated bowel .Risk:This patient has a high risk of morbidity 

due to further diagnostic testing or treatment and may suffer from an acute 

abdominal process disorder. Workup reveals concern for bowel perforation, i 

discussed with dr rinaldi,  and patient should be admitted for further workup. and 

possible expert consultation. Patient is not septic at this time.)


Impression:  


   Primary Impression:  


   Intractable abdominal pain


   Additional Impressions:  


   Free intraperitoneal air


   Uncontrolled diabetes mellitus


   Qualified Codes:  E11.65 - Type 2 diabetes mellitus with hyperglycemia


Disposition:  09 ADMITTED AS INPATIENT


Admit to:  [facility]


Condition:  Guarded





Critical Care Note


Critical Care Time?:  Yes


Critical care comment:


Severe Abdominal Pain


Authorized and Performed by: Paul Patel MD


Total critical care time: Approximately 48 minutes


Due to a high probability of clinically significant, life threatening deterio

ration, the patient required my highest level of preparedness to intervene 

emergently and I personally spent this critical care time directly and 

personally managing the patient. This critical care time included obtaining a 

history; examining the patient; pulse oximetry; ordering and review of studies; 

arranging urgent treatment with development of a management plan; evaluation of 

patient's response to treatment; frequent reassessment; and, discussions with 

other providers.


This critical care time was performed to assess and manage the high probability 

of imminent, life-threatening deterioration that could result in multi-organ 

failure. It was exclusive of separately billable procedures and treating other 

patients and teaching time.


Please see my other sections and the rest of the note for further information on

patient assessment and treatment.





Stability


Stability form required:  No





Heart Score


Heart Score:  








Heart Score Response (Comments) Value


 


History N/A 0


 


EKG N/A 0


 


Age N/A 0


 


Risk Factors N/A 0


 


Troponin N/A 0


 


Total  0














I personally scribed for PAUL PATEL MD ([email or web address]) on 1/23/25 at 23:38.  

Electronically submitted by Grady Hutchinson ([facility]).


I personally scribed for PAUL PATEL MD ([email or web address]) on 1/24/25 at 00:54.  

Electronically submitted by Grady Hutchinson ([facility]





PAUL PATEL MD               Jan 23, 2025 23:38

## 2025-01-24 VITALS
HEART RATE: 87 BPM | RESPIRATION RATE: 18 BRPM | SYSTOLIC BLOOD PRESSURE: 119 MMHG | OXYGEN SATURATION: 97 % | TEMPERATURE: 98.7 F | DIASTOLIC BLOOD PRESSURE: 60 MMHG

## 2025-01-24 VITALS — HEART RATE: 92 BPM | RESPIRATION RATE: 15 BRPM | OXYGEN SATURATION: 95 %

## 2025-01-24 VITALS — RESPIRATION RATE: 21 BRPM | HEART RATE: 102 BPM | OXYGEN SATURATION: 93 %

## 2025-01-24 VITALS — HEART RATE: 100 BPM | RESPIRATION RATE: 18 BRPM | OXYGEN SATURATION: 96 %

## 2025-01-24 LAB
ALBUMIN SERPL-MCNC: 3.9 G/DL (ref 3.2–4.8)
ALBUMIN SERPL-MCNC: 4.1 G/DL (ref 3.2–4.8)
ALP SERPL-CCNC: 128 U/L (ref 46–116)
ALP SERPL-CCNC: 140 U/L (ref 46–116)
ALT SERPL-CCNC: 12 U/L (ref 7–40)
ALT SERPL-CCNC: 12 U/L (ref 7–40)
ANION GAP SERPL CALCULATED.3IONS-SCNC: 10 MMOL/L (ref 5–15)
ANION GAP SERPL CALCULATED.3IONS-SCNC: 12 MMOL/L (ref 5–15)
ANION GAP SERPL CALCULATED.3IONS-SCNC: 7 MMOL/L (ref 5–15)
APTT PPP: 29.1 SEC (ref 24.5–34.5)
BILIRUB SERPL-MCNC: 0.4 MG/DL (ref 0.2–1)
BILIRUB SERPL-MCNC: 0.6 MG/DL (ref 0.2–1)
BUN SERPL-MCNC: 11 MG/DL (ref 9–23)
BUN SERPL-MCNC: 12 MG/DL (ref 9–23)
BUN SERPL-MCNC: 12 MG/DL (ref 9–23)
BUN/CREAT SERPL: 13 (ref 10–20)
BUN/CREAT SERPL: 13.8 (ref 10–20)
BUN/CREAT SERPL: 9.4 (ref 10–20)
CALCIUM SERPL-MCNC: 8.8 MG/DL (ref 8.7–10.4)
CALCIUM SERPL-MCNC: 9.7 MG/DL (ref 8.7–10.4)
CALCIUM SERPL-MCNC: 9.8 MG/DL (ref 8.7–10.4)
CHLORIDE SERPL-SCNC: 101 MMOL/L (ref 98–107)
CHLORIDE SERPL-SCNC: 105 MMOL/L (ref 98–107)
CHLORIDE SERPL-SCNC: 96 MMOL/L (ref 98–107)
CO2 SERPL-SCNC: 19 MMOL/L (ref 20–31)
CO2 SERPL-SCNC: 21 MMOL/L (ref 20–31)
CO2 SERPL-SCNC: 23 MMOL/L (ref 20–31)
GLUCOSE SERPL-MCNC: 296 MG/DL (ref 74–106)
GLUCOSE SERPL-MCNC: 324 MG/DL (ref 74–106)
GLUCOSE SERPL-MCNC: 644 MG/DL (ref 74–106)
HCT VFR BLD AUTO: 30.9 % (ref 41–53)
HCT VFR BLD AUTO: 35.5 % (ref 41–53)
HGB BLD-MCNC: 10.9 G/DL (ref 13.5–17.5)
HGB BLD-MCNC: 12 G/DL (ref 13.5–17.5)
INR PPP: 1.02 (ref 0.9–1.15)
LIPASE SERPL-CCNC: 32 U/L (ref 12–53)
MCH RBC QN AUTO: 28.9 PG (ref 28–32)
MCH RBC QN AUTO: 29.4 PG (ref 28–32)
MCV RBC AUTO: 83.3 FL (ref 80–100)
MCV RBC AUTO: 85.3 FL (ref 80–100)
NRBC BLD QL AUTO: 0 %
NRBC BLD QL AUTO: 0 %
POTASSIUM SERPL-SCNC: 2.7 MMOL/L (ref 3.5–5.1)
POTASSIUM SERPL-SCNC: 3.1 MMOL/L (ref 3.5–5.1)
POTASSIUM SERPL-SCNC: 3.3 MMOL/L (ref 3.5–5.1)
PROT SERPL-MCNC: 6.8 G/DL (ref 5.7–8.2)
PROT SERPL-MCNC: 7.1 G/DL (ref 5.7–8.2)
PROTHROMBIN TIME: 10.8 SEC (ref 9.3–11.8)
SODIUM SERPL-SCNC: 127 MMOL/L (ref 136–145)
SODIUM SERPL-SCNC: 132 MMOL/L (ref 136–145)
SODIUM SERPL-SCNC: 135 MMOL/L (ref 136–145)

## 2025-01-24 PROCEDURE — 0DBN0ZZ EXCISION OF SIGMOID COLON, OPEN APPROACH: ICD-10-PCS | Performed by: SURGERY

## 2025-01-24 RX ADMIN — SODIUM CHLORIDE ONE MLS/HR: 0.9 INJECTION, SOLUTION INTRAVENOUS at 14:08

## 2025-01-24 RX ADMIN — SODIUM CHLORIDE SCH MLS/HR: 0.9 INJECTION, SOLUTION INTRAVENOUS at 06:44

## 2025-01-24 RX ADMIN — HUMAN INSULIN ONE UNITS: 100 INJECTION, SOLUTION SUBCUTANEOUS at 04:18

## 2025-01-24 RX ADMIN — CEFAZOLIN SODIUM ONE MLS/HR: 2 SOLUTION INTRAVENOUS at 04:17

## 2025-01-24 RX ADMIN — IOHEXOL ONE MG: 300 INJECTION, SOLUTION INTRAVENOUS at 00:04

## 2025-01-24 RX ADMIN — BUPIVACAINE HYDROCHLORIDE ONE ML: 2.5 INJECTION, SOLUTION INFILTRATION; PERINEURAL at 16:24

## 2025-01-24 RX ADMIN — ACETAMINOPHEN ONE MLS/HR: 10 INJECTION, SOLUTION INTRAVENOUS at 16:25

## 2025-01-24 RX ADMIN — HYDROMORPHONE HYDROCHLORIDE PRN MG: 2 INJECTION, SOLUTION INTRAMUSCULAR; INTRAVENOUS; SUBCUTANEOUS at 18:30

## 2025-01-24 RX ADMIN — MORPHINE SULFATE ONE MG: 4 INJECTION, SOLUTION INTRAMUSCULAR; INTRAVENOUS at 04:19

## 2025-01-24 RX ADMIN — HYDROCODONE BITARTRATE AND ACETAMINOPHEN PRN TAB: 5; 325 TABLET ORAL at 21:12

## 2025-01-24 RX ADMIN — Medication SCH STRIP: at 05:58

## 2025-01-24 RX ADMIN — CEFTRIAXONE SODIUM SCH MLS/HR: 1 INJECTION, POWDER, FOR SOLUTION INTRAMUSCULAR; INTRAVENOUS at 09:43

## 2025-01-24 RX ADMIN — MORPHINE SULFATE PRN MG: 4 INJECTION, SOLUTION INTRAMUSCULAR; INTRAVENOUS at 14:09

## 2025-01-24 RX ADMIN — HUMAN INSULIN ONE MLS/HR: 100 INJECTION, SOLUTION SUBCUTANEOUS at 11:13

## 2025-01-24 RX ADMIN — PANTOPRAZOLE SODIUM SCH MG: 40 INJECTION, POWDER, FOR SOLUTION INTRAVENOUS at 09:43

## 2025-01-24 RX ADMIN — ONDANSETRON HYDROCHLORIDE ONE MG: 2 INJECTION, SOLUTION INTRAMUSCULAR; INTRAVENOUS at 04:17

## 2025-01-24 RX ADMIN — HUMAN INSULIN ONE UNITS: 100 INJECTION, SOLUTION SUBCUTANEOUS at 01:58

## 2025-01-24 RX ADMIN — SODIUM CHLORIDE SCH MLS/HR: 0.9 INJECTION, SOLUTION INTRAVENOUS at 18:15

## 2025-01-24 RX ADMIN — HUMAN INSULIN SCH UNITS: 100 INJECTION, SOLUTION SUBCUTANEOUS at 06:14

## 2025-01-24 RX ADMIN — SODIUM CHLORIDE ONE MLS/HR: 0.9 INJECTION, SOLUTION INTRAVENOUS at 04:18

## 2025-01-24 RX ADMIN — POTASSIUM CHLORIDE ONE MEQ: 1500 TABLET, EXTENDED RELEASE ORAL at 05:57

## 2025-01-24 RX ADMIN — MORPHINE SULFATE PRN MG: 2 INJECTION, SOLUTION INTRAMUSCULAR; INTRAVENOUS at 09:54

## 2025-01-24 NOTE — DVHOP2
Operative Report - 2


Report Details


Date:


1/24/25


Preop Diagnosis:


1. Necrotic colostomy with perforation


Postop Diagnosis:  


1. Same


Surgeon:


Anika Monterroso MD


Assistant:


None


Anesthesiologist:


Adriel Washington CRNA


Anesthesia:  General


Drains:


15 Congolese Rakan x 2


Consent:


The surgery and its risks including but not limited to infection, bleeding 

requiring possible blood transfusion with the risk of hepatitis or HIV in

fection, possible perioperative MI or stroke were explained to the patient and 

his wife.  All questions were answered to their satisfaction.  He expressed 

verbal understanding and wished to proceed with the surgery.


Complications:


None


Estimated Blood Loss:


100 mL


Fluids:


3 L


Findings:


Perforation with fecal contamination in the abdominal wall at the colostomy site





Name of Procedure Performed


Exploratory laparotomy with resection of necrotic and perforated colostomy with 

colostomy revision





Procedure Details


Procedure Details:


After induction of general anesthesia, a Emerson catheter was placed.  Patient's 

colostomy opening was then suture closed using 2-0 Vicryl sutures.  His abdomen 

including the colostomy site was then prepped and draped in standard surgical 

fashion.  Midline incision was made and this incision was taken through the 

abdominal wall down to the fascia which was opened using electrocautery.  There 

was no obvious abscess in the intra-abdominal cavity relatively small amount of 

adhesions.  These were all taken down and the dissection proceeded laterally to 

the left to the colostomy site.  Once the colon involving the colostomy site was

completely freed up, the colostomy was then detached from the skin.  Once we got

into the subcutaneous tissue, there fecal material contaminating the soft tissue

in this area.  An attempt was made to control this by stapling the colon and 

dividing the colon in the abdominal cavity from the portion of the colon 

involving the the abdominal wall.  Even then due to the diffuse dilatation of 

the colon within the abdominal wall, it took some time to clear out all the 

feces from this area.  The colon was then completely  and resected from

the abdominal wall and sent off the surgical field.  The fascial defect was then

closed using interrupted 1. Ethibond sutures.  Surgical site was then well 

irrigated with diluted Betadine irrigation.  Penrose drain was then placed into 

this abdominal wall defect and overlying soft tissue was reapproximated using 

interrupted 2-0 Vicryl sutures.  The skin was left partially open.  Minimal 

dissection was performed to free up the descending colon and was noted the 

patient had still somewhat of a tortuous colon and the colon involved with the 

colostomy site the appeared to be the sigmoid colon.  A small circular incision 

was made in the left upper quadrant and this incision was taken through the 

abdominal wall down to the fascia.  A cruciate incision was made at the fascia 

big enough to fit 3-0 my fingers easily.  Descending colon stump was then easily

placed through this opening without twisting of the mesentery.  2-0 Vicryl sutur

es were used to secure the mesocolon to the peritoneum to prevent any slippage. 

Abdominal cavity was then irrigated with approximately 6 L of warm Ancef 

irrigation.  A 15 Congolese Rakan drain was placed along the left gutter and 

brought out through a separate stab incision in the left lower quadrant and 

secured to the skin using 3-0 nylon sutures.  A 2nd 15 Congolese Rakan drain was pl

aced into the pelvis and brought out through stab incision in the right lower 

quadrant and secured to the skin using 3-0 nylon sutures.  Midline fascia was 

then closed using running looped 0 PDS sutures with interrupted 1. Ethibond 

sutures.  Surgical site was well irrigated again and skin incision was then 

closed using staples.  Colostomy was then matured by taking the in the staple 

line.  There was healthy bleeding in the mucosa appeared pink and viable.  I 

gently explored the lumen and there was no narrowing at the fascial level.  The 

colostomy was then matured using 2-0 and 3-0 Vicryl sutures.  Surgical sites 

were cleaned and dried and dressings were applied.  Sponge, needle, instrument 

count at the end of the case were reported to be correct by the nursing staff.  

Patient tolerated procedure well and was awakened, extubated and transferred to 

recovery in stable condition.


Specimen:


Sigmoid colon at the colostomy site





Condition


Stable





Disposition








 Still a Patient

















ANIKA MONTERROSO MD                 Jan 24, 2025 18:22

## 2025-01-24 NOTE — DVHPNRES
Progress Note


Date Seen:  Jan 24, 2025


Resident Creating Document:  MARV LOERA RESIDENT


Medical Necessity Reason


Pt with a Central, PICC or Fol:  No


Medical Necessity Reason


abdominal pain





Subjective


Review of Systems


This is a 51 year old male with a past medical history of colon cancer s/p 

section in 2015 and colostomy bag, diabetes mellitus, hypertension. He presented

to [facility] ED with complaint of acute abdominal pain. Patient 

said that he has been having the pain for the past 2 weeks that is generalized 

and associated with loss of  appetite, but without nausea or vomiting.  Patient 

also mentioned that he skin around the colostomy is red and the colostomy bag 

has some amount of blood in it. Patient denied chest pain, headache, dizziness, 

diaphoresis, shortness of breath, nausea, vomiting, fever, chill. Vitals are 

temperature 99.1, pulse 111, RR:16 and Blood pressure 128/71. Laboratory data 

showed WBC 11.3, platelets 358, sodium 127, potassium 3.1, BUN 11, creatinine 

1.17, GFR 72, glucose 644, lipase 32. Abdomen/pelvis CT revealing solid nodules 

measuring 9.5 mm is seen in the left lower lobe, and enhancing lesion measuring 

10 mm seen in the right lobe of liver suggestive of hemangioma or materials; 

colostomy is noted in the left lower quadrant with dilated bowel loops seen in 

the colostomy measuring a proximally 6.3 cm, appears to be loaded with feces; 

mild fat stranding is noted in the adjacent subcutaneous fat with extraluminal 

air foci questionable for perforation. Patient started IV antibiotic regimen 

Flagyl, 





Constitutional:  Denies fever no chills, fatigue and generalized malaise


HEENT: Denies headache,  ear pain, ear discharges, conjunctivitis, nasal 

discharge  throat pain 


Cardiovascular: Denies chest pain, palpitation, orthopnea, PND, or pedal edema


Respiratory: Denies shortness of breath, cough cough, sputum production, 

hemoptysis,


GI:  As mentioned in the HPI


: Denies frequency, urgency, hematuria,


Endocrine: Denies unintentional weight gain or weight loss, feeling of hot 

flashes,


Hema: Denies easy bruising, bleeding disorders, epistaxis


Musculoskeletal: Denies joint pains, muscle aches


Psych: No evidence of depression, fercho, suicidal ideation





Objective


vital signs





                                   Vital Sign








  Date Time  Temp Pulse Resp B/P (MAP) Pulse Ox O2 Delivery O2 Flow Rate FiO2


 


1/24/25 09:54  88 18 123/55    


 


1/24/25 09:00     95   


 


1/24/25 08:04      Room Air* 0 21


 


1/24/25 08:00 98.7       





 98.7       








medications





                               Current Medications








 Medications  Dose


 Ordered  Sig/Jennifer


 Route  Start Time


 Stop Time Status Last Admin


Dose Admin


 


 Pantoprazole


 Sodium  40 mg  DAILY


 IV  1/24/25 10:00


    1/24/25 09:43


40 MG


 


 Metronidazole  100 ml @ 


 100 mls/hr  Q8HR


 IV  1/24/25 14:00


    1/24/25 13:39


100 MLS/HR


 


 Ceftriaxone Sodium  50 ml @ 


 100 mls/hr  DAILY@09


 IV  1/24/25 09:00


    1/24/25 09:43


100 MLS/HR


 


 Diagnostic Test


  (Pha)  1 strip  Q6HR


 VI  1/24/25 06:00


    1/24/25 05:58


1 STRIP


 


 Insulin Human


 Regular    Q6HR


 SC  1/24/25 06:00


    1/24/25 12:00


8 UNITS


 


 Dextrose  50 ml  UD  PRN


 IV  1/24/25 04:15


     





 


 Sodium Chloride  1,000 ml @ 


 70 mls/hr  [id number]


 IV  1/24/25 04:15


    1/24/25 06:44


70 MLS/HR


 


 Acetaminophen/


 Hydrocodone Bitart  1 tab  Q4HP  PRN


 PO  1/24/25 04:15


     





 


 Ondansetron HCl  4 mg  Q4HP  PRN


 IV  1/24/25 04:15


     





 


 Docusate Sodium  100 mg  BIDPRN  PRN


 PO  1/24/25 04:15


     





 


 Acetaminophen  650 mg  Q6HP  PRN


 PO  1/24/25 04:15


     





 


 Nitroglycerin  0.4 mg  Q5MINP  PRN


 SL  1/24/25 04:30


     





 


 Morphine Sulfate  2 mg  Q30M  PRN


 IV  1/24/25 04:30


     





 


 Morphine Sulfate  4 mg  Q4HPRN  PRN


 IV  1/24/25 13:15


     











Examination


General Appearance:  Alert, Oriented X3, Cooperative, abdominal pain with mild 

distress


HEENT:  Atraumatic, PERRLA, EOMI, Mucous membrane moist/pink


Respiratory:  Clear to auscultation, Normal air movement


Cardiovascular:  Regular rate, Normal S1, Normal S2, No murmurs, no chest wall 

tenderness


Abdominal:  Distention, tenderness, hypoactive bowel sounds present, colostomy 

bag present with liquid feces and spots of blood


Extremities:  No clubbing, No cyanosis, No edema, Normal pulses, No 

tenderness/swelling


Skin:  No rashes, No breakdown, No significant lesion


Neuro:  Normal gait, Normal speech, Strength at 5/5 X4 ext, Normal tone, 

Sensation intact, Cranial nerves 3-12 NL, Reflexes 2+


Psych/Mental Status:  Mental status NL, Mood NL


laboratory and microbiology


                                Laboratory Tests


1/24/25 06:10

















Test


 1/24/25


06:10 Range/Units


 


 


Serum Glucose 324 #H   mg/dL











Problem List/Assessment/Plan


Problem List/Assessment/Plan


Assessment


Colonic perforation within the abdominal wall of the colostomy site likely 

secondary to necrotic colon.


Abdominal wall cellulitis.


hypokalemia


hyponatremia


Leukocytosis


History of colon cancer status post resection 


Possible metastasis to the lungs and liver, by CT report


Diabetes mellitus


Hypertension.








plan 


Antibiotics: Metronidazole and ceftriaxone 


NPO


IV fluid


Replace electrolytes


Daily CBC and CMP


Surgical consult 


GI consult





Code status: Full


Goal of care discussed for more than 35 minutes


Case and plan discussed and reviewed with Dr. Abdi


Plan discussed with:  Patient, Spouse





My Orders


My Orders





                         Orders - MARV LOERA








Procedure Category Date Status





  Time 


 


Potassium Chloride PHA 1/24/25 In Process





 (Potassium Chloride).  10:00 


 


* [facility] On Call CONS 1/24/25 Transmitted





  12:04 











Date of Service:  Jan 24, 2025


Billing Provider:  NATALIA ABDI MD


Common Visit Codes:  48765-TFOWADYLSE INP/OBS CARE(HIGH)


Secondary Visit Codes:  99497-ADVANCED CARE PLAN 30 MINUTES











MARV LOERA          Jan 24, 2025 14:18


NATALIA ABDI MD                 Jan 29, 2025 20:45

## 2025-01-24 NOTE — DVHINCON2
Date of service:  Jan 24, 2025





History of Present Illness


59-year-old male with a history of rectal cancer status post resection with left

lower quadrant end colostomy who has been complaining of over one-week history 

of pain at the colostomy site with some bleeding.  Patient denies any fevers or 

chills.  There is some output from the colostomy.





Past Medical History


Hypertension.  Diabetes.  History of rectal cancer.





Past Surgical History


Surgery for rectal cancer.  Cholecystectomy





Family History


Noncontributory


Social History


Denies alcohol, tobacco, IV drug use





Allergies:  


Coded Allergies:  


     NO KNOWN ALLERGIES (Unverified , 3/6/14)


Home Meds


Active Scripts


Pantoprazole Sodium Sesquihydr (Protonix) 40 Mg Tab, 40 MG PO DAILY, #30 TAB


   Prov:CECILE YODER MD         8/18/22


Ondansetron (Zofran) 4 Mg Tab, 1 TAB PO Q6HR, #20 TAB


   Prov:CECILE YODER MD         8/18/22


Reported Medications


Gabapentin (Gabapentin) 100 Mg Cap, 100 MG PO, CAP


   3/6/14


Glipizide (Glipizide) 5 Mg Tab, 5 MG PO, TAB


   3/6/14


Metformin Hydrochloride (Metformin Hcl) 500 Mg Tab, 500 MG PO, TAB


   3/6/14


Enalapril Maleate (VASOTEC TABLET) 2.5 Mg Tb, 2.5 MG GT


   3/6/14


Current Medications





                               Current Medications








 Medications


  (Trade)  Dose


 Ordered  Sig/Jennifer


 Route


 PRN Reason  Start Time


 Stop Time Status Last Admin


 


 Pantoprazole


 Sodium


  (Protonix)  40 mg  DAILY


 IV


   1/24/25 10:00


    1/24/25 09:43





 


 Metronidazole  100 ml @ 


 100 mls/hr  Q8HR


 IV


   1/24/25 14:00


     





 


 Ceftriaxone Sodium  50 ml @ 


 100 mls/hr  DAILY@09


 IV


   1/24/25 09:00


    1/24/25 09:43





 


 Diagnostic Test


  (Pha)


  (Accu-Chek


 Comfort Curve T)  1 strip  Q6HR


 VI


   1/24/25 06:00


    1/24/25 05:58





 


 Insulin Human


 Regular


  (InsuLIN R)    Q6HR


 SC


   1/24/25 06:00


    1/24/25 12:00





 


 Dextrose  50 ml  UD  PRN


 IV


 Blood Sugar LESS THAN 60  1/24/25 04:15


     





 


 Sodium Chloride  1,000 ml @ 


 70 mls/hr  [id number]


 IV


   1/24/25 04:15


    1/24/25 06:44





 


 Acetaminophen/


 Hydrocodone Bitart


  (Norco 5/325MG


 Tab)  1 tab  Q4HP  PRN


 PO


 MODERATE PAIN (4-6 PAIN SCALE)  1/24/25 04:15


     





 


 Ondansetron HCl


  (Zofran)  4 mg  Q4HP  PRN


 IV


 NAUSEA / VOMITING  1/24/25 04:15


     





 


 Docusate Sodium


  (Colace Capsule)  100 mg  BIDPRN  PRN


 PO


 FOR CONSTIPATION  1/24/25 04:15


     





 


 Acetaminophen


  (Tylenol Tablet)  650 mg  Q6HP  PRN


 PO


 PAIN SCALE 1-3  OR TEMP>100.4  1/24/25 04:15


     





 


 Morphine Sulfate  2 mg  Q4HPRN  PRN


 IV


 SEVERE PAIN (7-10 PAIN SCALE)  1/24/25 04:15


 1/24/25 13:11 DC 1/24/25 09:54





 


 Nitroglycerin


  (Ntrostat


 Sublingual)  0.4 mg  Q5MINP  PRN


 SL


 FOR CHEST PAIN  1/24/25 04:30


     





 


 Morphine Sulfate  2 mg  Q30M  PRN


 IV


 FOR CHEST PAIN  1/24/25 04:30


     





 


 Morphine Sulfate  4 mg  Q4HPRN  PRN


 IV


 SEVERE PAIN (7-10 PAIN SCALE)  1/24/25 13:15


   UNV  














Vital Signs





                                   Vital Signs








  Date Time  Temp Pulse Resp B/P (MAP) Pulse Ox O2 Delivery O2 Flow Rate FiO2


 


1/24/25 09:54  88 18 123/55    


 


1/24/25 09:00     95   


 


1/24/25 08:04      Room Air* 0 21


 


1/24/25 08:00 98.7       





 98.7       








Physical Exam


GEN:  Age-appropriate male in no acute distress.  Alert.





HEENT:  Normocephalic atraumatic.  Moist mucous membranes.  Anicteric sclerae.





CV:  RRR





Respiratory: CTAB





ABD:  There is a left lower quadrant colostomy with necrotic mucosa with minimal

stool output.  The area is very tender to palpation.  No surrounding erythema.  

No obvious narrowing or stricture with single digitation.  There was localized 

guarding and rebound.





CT of the abdomen and pelvis:  9.5 mm solid nodule in the left lower lobe of the

lung.  10 mm enhancing lesion in the right lobe of the liver.  Left lower 

quadrant colostomy with dilated bowel loops in the colostomy measuring up to 6.3

cm filled with feces.  There is fat stranding adjacent in this area with 

extraluminal air foci consistent with perforation.





Labs/Diagnostic Data





                                      Labs








Test


 1/24/25


12:43 1/24/25


06:10 1/24/25


03:40 1/23/25


23:40 Range/Units


 


 


POC Glucose 310 H      mg/dl


 


White Blood Count


 


 11.0 H


 


 


 4.4-10.8


10^3/uL


 


Red Blood Count


 


 3.71 L


 


 


 4.5-5.90


10^6/uL


 


Hemoglobin  10.9 L   13.5-17.5  g/dL


 


Hematocrit  30.9 #L   41.0-53.0  %


 


Mean Corpuscular Volume  83.3    80.0-100.0  fL


 


Mean Corpuscular Hemoglobin  29.4    28.0-32.0  pg


 


Mean Corpuscular Hemoglobin


Concent 


 35.3 


 


 


 32.0-36.0  g/dL





 


Red Cell Distribution Width  13.6    11.8-14.3  %


 


Platelet Count


 


 350 


 


 


 140-450


10^3/uL


 


Mean Platelet Volume  7.0    6.9-10.8  fL


 


Neutrophils (%) (Auto)  87.1 H   37.0-80.0  %


 


Lymphocytes (%) (Auto)  6.1 L   10.0-50.0  %


 


Monocytes (%) (Auto)  6.7    0.0-12.0  %


 


Eosinophils (%) (Auto)  0.0    0.0-7.0  %


 


Basophils (%) (Auto)  0.1    0.0-2.0  %


 


Neutrophils # (Auto)


 


 9.6 H


 


 


 1.6-8.6  10


^3/uL


 


Lymphocytes # (Auto)


 


 0.7 


 


 


 0.4-5.4  10


^3/uL


 


Monocytes # (Auto)  0.7    0-1.3  10 ^3/uL


 


Eosinophils # (Auto)  0    0-0.8  10 ^3/uL


 


Basophils # (Auto)  0    0-0.2  10 ^3/uL


 


Nucleated Red Blood Cells  0.0     %


 


Sodium Level  132 #L   136-145  mmol/L


 


Potassium Level  2.7 L   3.5-5.1  mmol/L


 


Chloride Level  101      mmol/L


 


Carbon Dioxide Level  21    20-31  mmol/L


 


Anion Gap  10    5-15  


 


Blood Urea Nitrogen  12    9-23  mg/dL


 


Creatinine


 


 0.92 


 


 


 0.700-1.30


mg/dL


 


Glomerular Filtration Rate


Calc 


 96 


 


 


 >90  mL/min





 


BUN/Creatinine Ratio  13.0    10.0-20.0  


 


Serum Glucose  324 #H     mg/dL


 


Calcium Level  9.8    8.7-10.4  mg/dL


 


Total Bilirubin  0.4    0.2-1.0  mg/dL


 


Aspartate Amino Transferase


(AST) 


 10 L


 


 


 13-40  U/L





 


Alanine Aminotransferase (ALT)  12    7-40  U/L


 


Alkaline Phosphatase  128 H     U/L


 


Total Protein  6.8    5.7-8.2  g/dL


 


Albumin  3.9    3.2-4.8  g/dL


 


Lactic Acid Level   1.9   0.4-2.0  mmol/L


 


Lipase    32  12-53  U/L











Assessment


1. Colonic perforation within the abdominal wall of the colostomy site likely 

secondary to necrotic colon.


Plan/Recommendation


1. Exploratory laparotomy with bowel resection with colostomy versus ileostomy.





Informed consent:  The surgery and its risks including but not limited to 

infection, bleeding requiring possible blood transfusion with the risk of 

hepatitis or HIV infection, possible perioperative MI or stroke, placing 

colostomy or ileostomy at a different site from the current colostomy site were 

explained to the patient and his wife.  All questions were answered to their 

satisfaction.  The patient expressed verbal understanding and wished to proceed 

with the surgery.


Plan discussed with:  Patient, Spouse











ANIKA MONTERROSO MD                 Jan 24, 2025 13:22 36.9

## 2025-01-24 NOTE — DVH
Critical Finding:



Examination:  ABPLIV



CLINICAL INDICATION:  ;abdominal pain, ca hx, ostomy



COMPARISON:  None.



CONTRAST USED:  Intravenous.



TECHNIQUE:  A contrast CT study of the abdomen and pelvis is performed.  The examination was performe
d with 5 mm thin slices.  Multiplanar reconstructions were obtained.  CT scan done according to ALARA
 (As Low As Reasonably Achievable).



FINDINGS:



Lung base:  Solid nodule measuring 9.5 mm is seen in the left lower lobe.



Small sliding hiatus hernia.



Subsegmental atelectasis is seen in the lingula.



Liver:  The liver is normal in size.  An enhancing lesion measuring 10 mm is seen in the right lobe o
f liver suggestive of hemangioma or metastasis.  Subcentimeter non-enhancing cyst is seen in segment 
VI of liver.  It is likely benign and requires no follow-up.  The portal venous radicles are normal. 
 There is no intrahepatic biliary radicle dilatation.



Gallbladder:  The gallbladder is not visualized (postoperative status).  The common bile duct is not 
dilated.



Pancreas:  The pancreas is normal in size and shape.  No focal lesion is seen within.  The peripancre
atic fa -planes are normal.



Spleen:  The spleen is normal in size and does not show any focal abnormality.



Retroperitoneum:  Both adrenal glands are normal in size and morphology.



There is no significant retroperitoneal lymphadenopathy.



The kidneys are normal in size with no hydronephrosis or renal calculi.



Vessels:  Aorta, IVC and the mesenteric vessels appear normal.



Stomach and bowel:  The bowel loops are unremarkable.  There is no ascites.



Skeletal system:  Degenerative changes are seen involving the spine.



Grade 1 anterolisthesis of L5 on S1 vertebra is seen with bilateral lysis.



CT PELVIS:



Appendix:  The appendix is unremarkable in appearance.



Colon:  Colostomy is noted in the left lower quadrant with dilated bowel loops seen in the colostomy 
measuring approximately 6.3 cm.  It appears to be loaded with feces.  Mild fat stranding is noted in 
the adjacent subcutaneous fat with extraluminal air foci questionable for perforation.



Bladder:  The urinary bladder is unremarkable.



Prostate appears normal.



No abnormal fluid collection is seen.



No pelvic lymphadenopathy is identified.



IMPRESSION:



1.  Solid nodule measuring 9.5 mm is seen in the left lower lobe.  Suggest further evaluation with CT
 chest study or PET CT study.



2.  An enhancing lesion measuring 10 mm is seen in the right lobe of liver suggestive of hemangioma o
r metastasis.



3.  Colostomy is noted in the left lower quadrant with dilated bowel loops seen in the colostomy lizz
uring approximately 6.3 cm.  It appears to be loaded with feces.  Mild fat stranding is noted in the 
adjacent subcutaneous fat with extraluminal air foci questionable for perforation.



4.  No abdominal mass or adenopathy.



5.  No ascites.



6.  No free air or inflammatory changes.



7.  Additional chronic and/or ancillary findings as detailed above.



8.  Suggest clinical correlation and follow-up as clinically deemed necessary.

Electronically Signed

1/24/2025 03:23

Yoel Edmond



Electronically Signed by: Mayito Diallo at 01/24/2025 03:23:00 AM

## 2025-01-24 NOTE — DVHHP2
History of Present Illness


Reason for Visit:  Acute abdominal pain


History of Present Illness


The patient is a 51 old male with past medical history of colon cancer on 

remission, diabetes mellitus, hypertension who presented to [facility] ED with complaint of acute abdominal pain. Patient reports he has been 

having diffuse abdominal pain for the past 10 days, associated with loss of 

appetite, nausea, vomiting, getting worse that prompted this visit. Patient was 

seen evaluated in the ED, laboratory data shows WBC 11.3, platelets 358, sodium 

127, potassium 3.1, BUN 11, creatinine 1.17, GFR 72, glucose 644, lipase 32. 

Abdomen/pelvis CT revealing solid nodules measuring 9.5 mm is seen in the left 

lower lobe, and enhancing lesion measuring 10 mm seen in the right lobe of liver

suggestive of hemangioma or materials; colostomy is noted in the left lower 

quadrant with dilated bowel loops seen in the colostomy measuring a proximally 

6.3 cm, appears to be loaded with feces; mild fat stranding is noted in the 

adjacent subcutaneous fat with extraluminal air foci questionable for 

perforation. Patient started IV antibiotic regimen Flagyl, please see medication

orders section in the computer. On my assessment, wife at bedside, patient 

denies chest pain, no headache, no dizziness, no diaphoresis, no shortness of 

breath, no nausea, no vomiting, no fever, no chills. Patient was admitted for 

further evaluation and medical management.





Past Medical History


Colon cancer  DM, HTN


Past Surgical History


Cholecystectomy, Ostomy


Family History


Reviewed, noncontributory to the management of this case.


Past Social History


The patient lives at home, denies smoking, alcohol or illicit drugs abuse.





Review of Systems


Constitutional:  Yes: Weakness; No: Fever, Chills, Sweats, Malaise, Other


Eyes:  No: Pain, Vision change, Conjunctivae inflammation, Eyelid inflammation, 

Other, Redness


ENT:  No: Ear pain, Ear discharge, Nose pain, Nose discharge, Nose congestion, 

Mouth pain, Mouth swelling, Throat pain, Throat swelling, Other


Respiratory:  No: Cough, Dry, Shortness of breath, SOB with excertion, Wheezing,

Hemoptysis, Pleuritic Pain, Sputum, Wheezing, Other


Cardiovascular:  No: Chest Pain, Palpitations, Orthopnea, Paroxysmal Noc. 

Dyspnea, Edema, Lt Headedness, Other


Gastrointestinal:  Nausea, Vomiting, Abdominal Pain, Other (Colostomy); No: 

Diarrhea, Constipation, Melena, Hematochezia


Genitourinary:  No Dysuria, No Frequency, No Incontinence, No Hematuria, No 

Retention, No Other


Musculoskeletal:  No: other, neck pain, shoulder pain, arm pain, back pain, hand

pain, leg pain, foot pain


Skin:  No: Rash, Lesions, Jaundice, Bruising, Other


Neurological:  No: Weakness, Numbness, Incoordination, Change in speech, C

onfusion, Seizures, Other


Allergies:  


Coded Allergies:  


     NO KNOWN ALLERGIES (Unverified , 3/6/14)


Medications





                               Current Medications








 Medications  Dose


 Ordered  Sig/Jennifer


 Route  Start Time


 Stop Time Status Last Admin


Dose Admin


 


 Pantoprazole


 Sodium  40 mg  DAILY


 IV  25 10:00


   UNV  





 


 Metronidazole  100 ml @ 


 100 mls/hr  Q8HR


 IV  25 06:00


   UNV  





 


 Ceftriaxone Sodium  50 ml @ 


 100 mls/hr  DAILY@09


 IV  25 09:00


   UNV  





 


 Diagnostic Test


  (Pha)  1 strip  Q6HR


 VI  25 06:00


   UNV  





 


 Insulin Human


 Regular    Q6HR


 SC  25 06:00


   UNV  





 


 Dextrose  50 ml  UD  PRN


 IV  25 04:15


   UNV  





 


 Sodium Chloride  1,000 ml @ 


 70 mls/hr  [id number]


 IV  25 04:15


   UNV  





 


 Acetaminophen/


 Hydrocodone Bitart  1 tab  Q4HP  PRN


 PO  25 04:15


   UNV  





 


 Ondansetron HCl  4 mg  Q4HP  PRN


 IV  25 04:15


   UNV  





 


 Docusate Sodium  100 mg  BIDPRN  PRN


 PO  25 04:15


   UNV  





 


 Acetaminophen  650 mg  Q6HP  PRN


 PO  25 04:15


   UNV  





 


 Morphine Sulfate  2 mg  Q4HPRN  PRN


 IV  25 04:15


   UNV  














Exam


Vital Signs





Vital Signs








  Date Time  Temp Pulse Resp B/P (MAP) Pulse Ox O2 Delivery O2 Flow Rate FiO2


 


25 04:19  94 15 113/69    


 


25 03:30 97.7    96   





 97.7       


 


25 01:56      Room Air  








General Appearance:  Alert, Oriented X3, Cooperative, No acute distress


HEENT:  Atraumatic, PERRLA, EOMI, Mucous membr. moist/pink


Respiratory:  Clear to auscultation, Normal air movement


Cardiovascular:  Regular rate, Normal S1, Normal S2, No murmurs


Abdominal:  Normal bowel sounds, Soft, No hepatospenomegaly, No masses, Other 

(Reports tenderness)


Extremities:  No clubbing, No cyanosis, No edema, Normal pulses, No 

tenderness/swelling


Skin:  No rashes, No breakdown, No significant lesion


Neuro:  Normal speech, Normal tone, Sensation intact, Cranial nerves 3-12 NL, 

Reflexes 2+, Other (Generalized weakness)


Psych/Mental Status:  Mental status NL, Mood NL





Labs/Xrays





                                      Labs








Test


 25


04:06 25


03:40 25


23:40 Range/Units


 


 


POC Glucose 421 *H     mg/dl


 


White Blood Count


 


 


 11.3 H


 4.4-10.8


10^3/uL


 


Red Blood Count


 


 


 4.16 L


 4.5-5.90


10^6/uL


 


Hemoglobin   12.0 L 13.5-17.5  g/dL


 


Hematocrit   35.5 L 41.0-53.0  %


 


Mean Corpuscular Volume   85.3  80.0-100.0  fL


 


Mean Corpuscular Hemoglobin   28.9  28.0-32.0  pg


 


Mean Corpuscular Hemoglobin


Concent 


 


 33.8 


 32.0-36.0  g/dL





 


Red Cell Distribution Width   13.8  11.8-14.3  %


 


Platelet Count


 


 


 358 


 140-450


10^3/uL


 


Mean Platelet Volume   7.2  6.9-10.8  fL


 


Neutrophils (%) (Auto)   91.2 H 37.0-80.0  %


 


Lymphocytes (%) (Auto)   4.1 L 10.0-50.0  %


 


Monocytes (%) (Auto)   4.4  0.0-12.0  %


 


Eosinophils (%) (Auto)   0.1  0.0-7.0  %


 


Basophils (%) (Auto)   0.2  0.0-2.0  %


 


Neutrophils # (Auto)


 


 


 10.3 H


 1.6-8.6  10


^3/uL


 


Lymphocytes # (Auto)


 


 


 0.5 


 0.4-5.4  10


^3/uL


 


Monocytes # (Auto)   0.5  0-1.3  10 ^3/uL


 


Eosinophils # (Auto)   0  0-0.8  10 ^3/uL


 


Basophils # (Auto)   0  0-0.2  10 ^3/uL


 


Nucleated Red Blood Cells   0.0   %


 


Sodium Level   127 L 136-145  mmol/L


 


Potassium Level   3.1 L 3.5-5.1  mmol/L


 


Chloride Level   96 L   mmol/L


 


Carbon Dioxide Level   19 L 20-31  mmol/L


 


Anion Gap   12  5-15  


 


Blood Urea Nitrogen   11  9-23  mg/dL


 


Creatinine


 


 


 1.17 


 0.700-1.30


mg/dL


 


Glomerular Filtration Rate


Calc 


 


 72 


 >90  mL/min





 


BUN/Creatinine Ratio   9.4 L 10.0-20.0  


 


Serum Glucose   644 *H   mg/dL


 


Calcium Level   9.7  8.7-10.4  mg/dL


 


Total Bilirubin   0.6  0.2-1.0  mg/dL


 


Aspartate Amino Transferase


(AST) 


 


 12 L


 13-40  U/L





 


Alanine Aminotransferase (ALT)   12  7-40  U/L


 


Alkaline Phosphatase   140 H   U/L


 


Total Protein   7.1  5.7-8.2  g/dL


 


Albumin   4.1  3.2-4.8  g/dL


 


Lipase   32  12-53  U/L











PATIENT: CLAUDIO SCHOFIELD    ACCT: [id number]        UNIT: [id number]


: 1965           LOC: ER                   ROOM / BED:  / 


AGE / SEX: 59 / M         ADM STATUS: REG ER        SERVICE DT: [id number]


ORDERING PHYSICIAN: PAUL PATEL MD


PROCEDURE(s): ABPLIV - CT AB PEL WITH IV CON ONLY


REASON: abdominal pain, ca hx, ostomy


ORDER NUMBER(s): [id number], ACCESSION NUMBER(s): [id number]





Critical Finding:


Examination: ABPLIV


CLINICAL INDICATION:  ;abdominal pain, ca hx, ostomy


COMPARISON:  None.


CONTRAST USED: Intravenous.


TECHNIQUE:  A contrast CT study of the abdomen and pelvis is performed. The 

examination was performed with 5 mm thin slices. Multiplanar reconstructions 

were obtained. CT scan done according to ALARA (As Low As Reasonably Achie

vable).





FINDINGS:


Lung base: Solid nodule measuring 9.5 mm is seen in the left lower lobe.


Small sliding hiatus hernia.


Subsegmental atelectasis is seen in the lingula.


Liver: The liver is normal in size. An enhancing lesion measuring 10 mm is seen 

in the right lobe of liver suggestive of hemangioma or metastasis. Subcentimeter

non-enhancing cyst is seen in segment VI of liver. It is likely benign and 

requires no follow-up. The portal venous radicles are normal. There is no 

intrahepatic biliary radicle dilatation.





Gallbladder: The gallbladder is not visualized (postoperative status). The 

common bile duct is not dilated.


Pancreas: The pancreas is normal in size and shape. No focal lesion is seen 

within. The peripancreatic fa -planes are normal.


Spleen: The spleen is normal in size and does not show any focal abnormality.


Retroperitoneum: Both adrenal glands are normal in size and morphology.


There is no significant retroperitoneal lymphadenopathy.





The kidneys are normal in size with no hydronephrosis or renal calculi.


Vessels: Aorta, IVC and the mesenteric vessels appear normal.


Stomach and bowel: The bowel loops are unremarkable. There is no ascites.


Skeletal system: Degenerative changes are seen involving the spine.


Grade 1 anterolisthesis of L5 on S1 vertebra is seen with bilateral lysis.





CT PELVIS:


Appendix: The appendix is unremarkable in appearance.


Colon: Colostomy is noted in the left lower quadrant with dilated bowel loops 

seen in the colostomy measuring approximately 6.3 cm. It appears to be loaded 

with feces. Mild fat stranding is noted in the adjacent subcutaneous fat with 

extraluminal air foci questionable for perforation.


Bladder: The urinary bladder is unremarkable.


Prostate appears normal.


No abnormal fluid collection is seen.


No pelvic lymphadenopathy is identified.





IMPRESSION:


1. Solid nodule measuring 9.5 mm is seen in the left lower lobe. Suggest further

evaluation with CT chest study or PET CT study.


2. An enhancing lesion measuring 10 mm is seen in the right lobe of liver 

suggestive of hemangioma or metastasis.


3. Colostomy is noted in the left lower quadrant with dilated bowel loops seen 

in the colostomy measuring approximately 6.3 cm. It appears to be loaded with 

feces. Mild fat stranding is noted in the adjacent subcutaneous fat with e

xtraluminal air foci questionable for perforation.


4. No abdominal mass or adenopathy.


5. No ascites.


6. No free air or inflammatory changes.


7. Additional chronic and/or ancillary findings as detailed above.


8. Suggest clinical correlation and follow-up as clinically deemed necessary.





ORDERING PHYSICIAN: PAUL PATEL MD


PROCEDURE(s): CXRP - CHEST PORTABLE


REASON: abdominal pain, ca hx, ostomy


ORDER NUMBER(s): [id number], ACCESSION NUMBER(s): [id number]





EXAM: XY CHEST PORTABLE


CLINICAL HISTORY: abdominal pain, ca hx, ostomy


TECHNIQUE: Single AP view of the chest


WID: 


COMPARISON: XY CHEST PORTABLE on DOS: 23


FINDINGS: 


Lines and tubes: Right IJ chest port with the tip projecting over the mid SVC.





Chest: 


The heart size and pulmonary vasculature is within normal limits.


No pleural effusion, pneumothorax, or consolidation. Linear scarring or 

atelectasis in the medial right lung base.


The osseous structures are grossly intact.





IMPRESSION: 


No acute cardiopulmonary abnormality.





Assessment/Plan


Assessment/Plan


Intractable abdominal pain


Bowel perforation


Free intraperitoneal air


Leukocytosis, unspecified


Electrolyte imbalance


Uncontrolled diabetes mellitus


Type 2 diabetes mellitus with hyperglycemia





Plan


1.  Admit to telemetry unit


2.  Breathing treatment


3.  Pain control management


4.  IV antibiotic management


5.  Management of fluids and electrolytes


6.  Consultation for surgery


7.  Diagnostic test abdomen/pelvis CT


8.  DVT prophylaxis-on SCDs


9.  Repeat labs CBC, CMP in a.m.


10. Home medication reviewed and reconciled


11. Continue with current medical management


12. Treatment plan discussed with patient and RN. Patient verbalized 

understanding.


Plan discussed with:  Patient, Other (RN)


My Orders





                          Orders - MILEY MCINTOSH DNP








Procedure Category Date Status





  Time 


 


Complete Blood Count LAB 25 Logged





  04:15 


 


Comprehensive LAB 25 Logged





Metabolic Panel  04:15 


 


Pantoprazole PHA 25 Logged





 (Protonix)  10:00 


 


Metronidazole PHA 25 Logged





500mg/100ml (Flagyl  06:00 


 


Ceftriaxone 1gm/50ml PHA 25 Logged





D5w (Rocephin)  09:00 


 


Glucose Blood PHA 25 Logged





 (Accu-Chek Comfort  06:00 


 


Insulin R (Human) PHA 25 Logged





 (Insulin R)  06:00 


 


Dextrose 50% Syringe PHA 25 Logged





  04:15 


 


Allergies ALLY 25 In Process





  04:15 


 


Code Status CODE 25 Transmitted





  04:15 


 


Sodium Chloride 0.9% PHA 25 Logged





  04:15 


 


Oxygen Per Hour RT 25 Transmitted





  04:15 


 


Hydrocodone-Acet PHA 25 Logged





5/325mg Tab (Norco  04:15 


 


Ondansetron Hcl PHA 25 Logged





 (Zofran)  04:15 


 


Docusate Sodium PHA 25 Logged





Capsule (Colace  04:15 


 


Complete Blood Count LAB 25 Verified





  04:00 


 


Comprehensive LAB 25 Verified





Metabolic Panel  04:00 


 


Npo (Nothing By DIET 25 Transmitted





Mouth) Diet  Breakfast 


 


Condition: Serious ALLY 25 In Process





  04:15 


 


Acetaminophen Tablet PHA 25 Logged





 (Tylenol Tablet)  04:15 


 


Bedrest With Bathroom ALLY 25 In Process





Privileg  04:15 


 


Morphine Sulfate PHA 25 Logged





Injection  04:15 


 


Sequential [facility] 25 In Process





Compression Device   


 


Potassium Er Tablet PHA 25 Logged





 (Klor-Con Tablet)  04:30 








Problem List:  


(1) Intractable abdominal pain


(2) Leukocytosis, unspecified


(3) Bowel perforation


(4) Uncontrolled diabetes mellitus


(5) Free intraperitoneal air


(6) Electrolyte imbalance


(7) Type 2 diabetes mellitus with hyperglycemia





Date of Service:  2025


Billing Provider:  MILEY MCINTOSH DNP


Common Visit Codes:  99223-INITIAL  INP/OBS CARE (HIGH)











MILEY MCINTOSH DNP            2025 04:36

## 2025-01-24 NOTE — DVH
EXAM: XY CHEST PORTABLE



CLINICAL HISTORY: abdominal pain, ca hx, ostomy



TECHNIQUE: Single AP view of the chest



WID: 



COMPARISON: XY CHEST PORTABLE on DOS: 7/2/23



FINDINGS: 



Lines and tubes: Right IJ chest port with the tip projecting over the mid SVC.



Chest: 



The heart size and pulmonary vasculature is within normal limits.



No pleural effusion, pneumothorax, or consolidation. Linear scarring or atelectasis in the medial rig
ht lung base.



The osseous structures are grossly intact.



IMPRESSION: 



No acute cardiopulmonary abnormality.



Electronically Signed by: Mariah Wick at 01/24/2025 01:08:45 AM

## 2025-01-25 VITALS
DIASTOLIC BLOOD PRESSURE: 72 MMHG | HEART RATE: 70 BPM | RESPIRATION RATE: 16 BRPM | SYSTOLIC BLOOD PRESSURE: 123 MMHG | TEMPERATURE: 97.7 F | OXYGEN SATURATION: 97 %

## 2025-01-25 VITALS
HEART RATE: 69 BPM | DIASTOLIC BLOOD PRESSURE: 63 MMHG | OXYGEN SATURATION: 98 % | TEMPERATURE: 97.4 F | RESPIRATION RATE: 18 BRPM | SYSTOLIC BLOOD PRESSURE: 127 MMHG

## 2025-01-25 VITALS — RESPIRATION RATE: 18 BRPM | HEART RATE: 66 BPM

## 2025-01-25 VITALS
HEART RATE: 71 BPM | DIASTOLIC BLOOD PRESSURE: 70 MMHG | OXYGEN SATURATION: 96 % | SYSTOLIC BLOOD PRESSURE: 119 MMHG | RESPIRATION RATE: 17 BRPM | TEMPERATURE: 98 F

## 2025-01-25 VITALS — OXYGEN SATURATION: 94 % | RESPIRATION RATE: 19 BRPM | HEART RATE: 56 BPM

## 2025-01-25 VITALS
OXYGEN SATURATION: 98 % | HEART RATE: 61 BPM | SYSTOLIC BLOOD PRESSURE: 120 MMHG | RESPIRATION RATE: 16 BRPM | DIASTOLIC BLOOD PRESSURE: 68 MMHG | TEMPERATURE: 97.7 F

## 2025-01-25 VITALS
DIASTOLIC BLOOD PRESSURE: 72 MMHG | HEART RATE: 70 BPM | OXYGEN SATURATION: 98 % | RESPIRATION RATE: 17 BRPM | SYSTOLIC BLOOD PRESSURE: 124 MMHG | TEMPERATURE: 97.7 F

## 2025-01-25 VITALS
OXYGEN SATURATION: 98 % | RESPIRATION RATE: 18 BRPM | DIASTOLIC BLOOD PRESSURE: 78 MMHG | TEMPERATURE: 97.6 F | SYSTOLIC BLOOD PRESSURE: 136 MMHG | HEART RATE: 78 BPM

## 2025-01-25 LAB
ALBUMIN SERPL-MCNC: 3.7 G/DL (ref 3.2–4.8)
ALP SERPL-CCNC: 105 U/L (ref 46–116)
ALT SERPL-CCNC: 13 U/L (ref 7–40)
ANION GAP SERPL CALCULATED.3IONS-SCNC: 11 MMOL/L (ref 5–15)
APTT PPP: 30.2 SEC (ref 24.5–34.5)
BILIRUB SERPL-MCNC: 0.3 MG/DL (ref 0.2–1)
BUN SERPL-MCNC: 20 MG/DL (ref 9–23)
BUN/CREAT SERPL: 21.7 (ref 10–20)
CALCIUM SERPL-MCNC: 9.3 MG/DL (ref 8.7–10.4)
CHLORIDE SERPL-SCNC: 109 MMOL/L (ref 98–107)
CO2 SERPL-SCNC: 17 MMOL/L (ref 20–31)
GLUCOSE SERPL-MCNC: 389 MG/DL (ref 74–106)
HCT VFR BLD AUTO: 31.4 % (ref 41–53)
HGB BLD-MCNC: 10.9 G/DL (ref 13.5–17.5)
INR PPP: 1.03 (ref 0.9–1.15)
MCH RBC QN AUTO: 29.7 PG (ref 28–32)
MCV RBC AUTO: 86 FL (ref 80–100)
NRBC BLD QL AUTO: 0 %
POTASSIUM SERPL-SCNC: 4.6 MMOL/L (ref 3.5–5.1)
PROT SERPL-MCNC: 6.1 G/DL (ref 5.7–8.2)
PROTHROMBIN TIME: 10.9 SEC (ref 9.3–11.8)
SODIUM SERPL-SCNC: 137 MMOL/L (ref 136–145)

## 2025-01-25 NOTE — DVHPNRES
Progress Note


Date Seen:  Jan 25, 2025


Resident Creating Document:  MARV LOERA RESIDENT


Medical Necessity Reason


Pt with a Central, PICC or Fol:  No


Medical Necessity Reason


S/P surgery


History of colon cancer with colostomy bag present





Subjective


Review of Systems


This is a 51 year old male with a past medical history of colon cancer s/p 

section in 2015 and colostomy bag, diabetes mellitus, hypertension. He presented

to [facility] ED with complaint of acute abdominal pain. Patient 

said that he has been having the pain for the past 2 weeks that is generalized 

and associated with loss of  appetite, but without nausea or vomiting.  Patient 

also mentioned that he skin around the colostomy is red and the colostomy bag 

has some amount of blood in it. Patient denied chest pain, headache, dizziness, 

diaphoresis, shortness of breath, nausea, vomiting, fever, chill. Vitals are 

temperature 99.1, pulse 111, RR:16 and Blood pressure 128/71. Laboratory data 

showed WBC 11.3, platelets 358, sodium 127, potassium 3.1, BUN 11, creatinine 

1.17, GFR 72, glucose 644, lipase 32. Abdomen/pelvis CT revealing solid nodules 

measuring 9.5 mm is seen in the left lower lobe, and enhancing lesion measuring 

10 mm seen in the right lobe of liver suggestive of hemangioma or materials; 

colostomy is noted in the left lower quadrant with dilated bowel loops seen in 

the colostomy measuring a proximally 6.3 cm, appears to be loaded with feces; 

mild fat stranding is noted in the adjacent subcutaneous fat with extraluminal 

air foci questionable for perforation. Patient started IV antibiotic regimen 

Flagyl, 





PN 1/25/2025:  Patient is seen and examined today at the bedside.  He is status 

post surgery.  He has NG tube placed draining abdominal contents. He is on pain 

medication on board.  Be 0.  Labs today showed WBC of 9.3, chloride 107 carbon 

dioxide 17 his, glucose 389.  Patient currently patient is currently on 

ceftriaxone and metronidazole and also morphine 4 mg Q 4 H p.r.n.





Objective


vital signs





                                   Vital Sign








  Date Time  Temp Pulse Resp B/P (MAP) Pulse Ox O2 Delivery O2 Flow Rate FiO2


 


1/25/25 15:22  70 16 123/72    


 


1/25/25 12:30 97.7    97   





 97.7       


 


1/25/25 08:00      Nasal Cannula* 2 28














                           Total Intake and Output   


 


 1/24/25 1/24/25 1/25/25





 15:00 23:00 07:00


 


Intake Total 744.75 ml 100 ml 100 ml


 


Output Total  390 ml 410 ml


 


Balance 744.75 ml -290 ml -310 ml








medications





                               Current Medications








 Medications  Dose


 Ordered  Sig/Jennifer


 Route  Start Time


 Stop Time Status Last Admin


Dose Admin


 


 Pantoprazole


 Sodium  40 mg  DAILY


 IV  1/24/25 10:00


    1/25/25 10:16


40 MG


 


 Metronidazole  100 ml @ 


 100 mls/hr  Q8HR


 IV  1/24/25 14:00


    1/25/25 14:05


100 MLS/HR


 


 Ceftriaxone Sodium  50 ml @ 


 100 mls/hr  DAILY@09


 IV  1/24/25 09:00


    1/25/25 10:16


100 MLS/HR


 


 Diagnostic Test


  (Pha)  1 strip  Q6HR


 VI  1/24/25 06:00


    1/25/25 12:00


1 STRIP


 


 Insulin Human


 Regular    Q6HR


 SC  1/24/25 06:00


    1/25/25 12:29


8 UNITS


 


 Dextrose  50 ml  UD  PRN


 IV  1/24/25 04:15


     





 


 Acetaminophen/


 Hydrocodone Bitart  1 tab  Q4HP  PRN


 PO  1/24/25 04:15


    1/24/25 21:12


1 TAB


 


 Ondansetron HCl  4 mg  Q4HP  PRN


 IV  1/24/25 04:15


     





 


 Docusate Sodium  100 mg  BIDPRN  PRN


 PO  1/24/25 04:15


     





 


 Acetaminophen  650 mg  Q6HP  PRN


 PO  1/24/25 04:15


     





 


 Nitroglycerin  0.4 mg  Q5MINP  PRN


 SL  1/24/25 04:30


     





 


 Morphine Sulfate  2 mg  Q30M  PRN


 IV  1/24/25 04:30


     





 


 Morphine Sulfate  4 mg  Q4HPRN  PRN


 IV  1/24/25 13:15


    1/25/25 15:22


4 MG


 


 Sodium Chloride  1,000 ml @ 


 100 mls/hr  Q10H


 IV  1/24/25 18:15


    1/25/25 14:15


100 MLS/HR


 


 Phenol/Menthol  1 spr  Q2HP  PRN


 MT  1/25/25 13:30


     











Examination


General Appearance:  Alert, Oriented X3, Cooperative, abdominal pain with mild 

distress, NG tube placed, fluids running


HEENT:  Atraumatic, PERRLA, EOMI, Mucous membrane moist/pink


Respiratory:  Clear to auscultation, Normal air movement


Cardiovascular:  Regular rate, Normal S1, Normal S2, No murmurs, no chest wall 

tenderness


Abdominal:  Status post surgery.  Abdominal bandage in been place surgical site 

and intact no drainage noted patient is an abdominal pain 


Extremities:  No clubbing, No cyanosis, No edema, Normal pulses, No 

tenderness/swelling


Skin:  No rashes, No breakdown, No significant lesion


Neuro:  Normal gait, Normal speech, Strength at 5/5 X4 ext, Normal tone, 

Sensation intact, Cranial nerves 3-12 NL, Reflexes 2+


Psych/Mental Status:  Mental status NL, Mood NL


laboratory and microbiology


                                Laboratory Tests


1/25/25 05:38

















Test


 1/25/25


05:38 Range/Units


 


 


Serum Glucose 389 H   mg/dL








                                  Microbiology








 Date/Time


Source Procedure


Growth Status





 


 1/24/25 17:20


Anus Gram Stain


Pending Resulted





 1/24/25 17:20


Anus Anaerobic Culture - Preliminary


 Resulted


 


 1/24/25 17:20


Anus Aerobic Culture - Preliminary


 Resulted


 


 1/24/25 03:40


Blood Blood Culture - Preliminary


NO GROWTH AFTER 24 HOURS OF INCUBATION. Resulted








Labs and/or images reviewed:  Labs reviewed by me, Image(s) reviewed by me





Problem List/Assessment/Plan


Problem List/Assessment/Plan


Assessment


Sepsis due to  colonic perforation/abdominal wall cellulitis; present on 

Admission


Colonic perforation within the abdominal wall of the colostomy site likely 

secondary to necrotic colon


--> s/p Exploratory laparotomy with resection of necrotic and perforated 

colostomy with colostomy revision


Abdominal wall cellulitis.


hypokalemia


hyponatremia


Leukocytosis


History of colon cancer status post resection 


Possible metastasis to the lungs and liver, by CT report


Diabetes mellitus


Hypertension.


Mild anemia








plan 


Antibiotics: Metronidazole and ceftriaxone 


NPO, 


IV fluid


Replace electrolytes


Daily CBC and CMP


Surgical team following


GI consult


To follow up with Dr. Arredondo about the new findings in lung and liver; the 

patient's wife was provided with a copy of imaging showing lung nodule and liver

nodule





Code status: Full


Goal of care discussed for 20 minutes


Case and plan discussed and reviewed with Dr. Lezama


Plan discussed with:  Patient, Spouse, Other (RN)





Addendum


I was physically present for the key portions of the service provided to patient

by THE RESIDENT. I have reviewed the documentation, discussed the case with 

resident and agree with the resident's documentation except as noted.  Also the 

patient's clinical case was discussed with the patient's nurse.








This medical document was created using an electronic medical record system with

computerized dictation system.  Although this document has been carefully 

reviewed, there might still be some phonetic and typographical errors.  These 

areas are purely typographical due to imperfections of the software programs, 

and do not reflect any compromise in the patient's medical care.





Late signature.





Date of Service:  Jan 25, 2025


Billing Provider:  RICARDO LEZAMA MD


Common Visit Codes:  92611-[id number] INP/OBS CARE(HIGH)


Secondary Visit Codes:  99497-ADVANCED CARE PLAN 30 MINUTES (20 minutes)











MARV LOERA RESIDENT          Jan 25, 2025 16:23


RICARDO LEZAMA MD             Jan 26, 2025 21:31

## 2025-01-25 NOTE — DVHPN2
Progress Note - Dictate


Date Seen:  Jan 25, 2025


Medical Necessity Reason


Pt with a Central, PICC or Fol:  No


Subjective


E:  no major events o/n.





c/o sore throat.


vital signs





                                   Vital Sign








  Date Time  Temp Pulse Resp B/P (MAP) Pulse Ox O2 Delivery O2 Flow Rate FiO2


 


1/25/25 10:16  97 19 122/73    


 


1/25/25 08:25 97.7    98   





 97.7       


 


1/25/25 08:00      Nasal Cannula* 2 28














                           Total Intake and Output   


 


 1/24/25 1/24/25 1/25/25





 15:00 23:00 07:00


 


Intake Total 744.75 ml 100 ml 100 ml


 


Output Total  390 ml 410 ml


 


Balance 744.75 ml -290 ml -310 ml








medications





                               Current Medications








 Medications  Dose


 Ordered  Sig/Jennifer


 Route  Start Time


 Stop Time Status Last Admin


Dose Admin


 


 Pantoprazole


 Sodium  40 mg  DAILY


 IV  1/24/25 10:00


    1/25/25 10:16


40 MG


 


 Metronidazole  100 ml @ 


 100 mls/hr  Q8HR


 IV  1/24/25 14:00


    1/25/25 05:37


100 MLS/HR


 


 Ceftriaxone Sodium  50 ml @ 


 100 mls/hr  DAILY@09


 IV  1/24/25 09:00


    1/25/25 10:16


100 MLS/HR


 


 Diagnostic Test


  (Pha)  1 strip  Q6HR


 VI  1/24/25 06:00


    1/25/25 12:00


1 STRIP


 


 Insulin Human


 Regular    Q6HR


 SC  1/24/25 06:00


    1/25/25 12:29


8 UNITS


 


 Dextrose  50 ml  UD  PRN


 IV  1/24/25 04:15


     





 


 Acetaminophen/


 Hydrocodone Bitart  1 tab  Q4HP  PRN


 PO  1/24/25 04:15


    1/24/25 21:12


1 TAB


 


 Ondansetron HCl  4 mg  Q4HP  PRN


 IV  1/24/25 04:15


     





 


 Docusate Sodium  100 mg  BIDPRN  PRN


 PO  1/24/25 04:15


     





 


 Acetaminophen  650 mg  Q6HP  PRN


 PO  1/24/25 04:15


     





 


 Nitroglycerin  0.4 mg  Q5MINP  PRN


 SL  1/24/25 04:30


     





 


 Morphine Sulfate  2 mg  Q30M  PRN


 IV  1/24/25 04:30


     





 


 Morphine Sulfate  4 mg  Q4HPRN  PRN


 IV  1/24/25 13:15


    1/25/25 10:16


4 MG


 


 Sodium Chloride  1,000 ml @ 


 100 mls/hr  Q10H


 IV  1/24/25 18:15


    1/25/25 05:32


100 MLS/HR


 


 Phenol/Menthol  1 spr  Q2HP  PRN


 MT  1/25/25 13:30


   UNV  











objective


GEN:  NAD





ABD:  surgical dressings clean and dry.  JPs with scant output.  NGT 50 mL


laboratory and microbiology


                                Laboratory Tests


1/25/25 05:38

















Test


 1/25/25


05:38 Range/Units


 


 


Serum Glucose 389 H   mg/dL








Assessment/Plan


A:


1.  POD #1





P:


1.  cont curr tx


2.  PT eval.


Plan discussed with:  Patient











ANIKA MONTERROSO MD                 Jan 25, 2025 13:31

## 2025-01-26 VITALS — HEART RATE: 68 BPM | RESPIRATION RATE: 19 BRPM | OXYGEN SATURATION: 94 %

## 2025-01-26 VITALS
TEMPERATURE: 97.7 F | OXYGEN SATURATION: 97 % | SYSTOLIC BLOOD PRESSURE: 147 MMHG | DIASTOLIC BLOOD PRESSURE: 69 MMHG | RESPIRATION RATE: 17 BRPM | HEART RATE: 77 BPM

## 2025-01-26 VITALS
TEMPERATURE: 97.9 F | HEART RATE: 69 BPM | DIASTOLIC BLOOD PRESSURE: 74 MMHG | SYSTOLIC BLOOD PRESSURE: 142 MMHG | RESPIRATION RATE: 17 BRPM | OXYGEN SATURATION: 98 %

## 2025-01-26 VITALS
OXYGEN SATURATION: 96 % | TEMPERATURE: 97.9 F | SYSTOLIC BLOOD PRESSURE: 145 MMHG | RESPIRATION RATE: 15 BRPM | DIASTOLIC BLOOD PRESSURE: 75 MMHG | HEART RATE: 71 BPM

## 2025-01-26 VITALS
RESPIRATION RATE: 17 BRPM | SYSTOLIC BLOOD PRESSURE: 129 MMHG | HEART RATE: 71 BPM | TEMPERATURE: 97.9 F | DIASTOLIC BLOOD PRESSURE: 60 MMHG | OXYGEN SATURATION: 97 %

## 2025-01-26 VITALS
RESPIRATION RATE: 19 BRPM | OXYGEN SATURATION: 98 % | HEART RATE: 64 BPM | TEMPERATURE: 98.4 F | DIASTOLIC BLOOD PRESSURE: 73 MMHG | SYSTOLIC BLOOD PRESSURE: 141 MMHG

## 2025-01-26 VITALS — HEART RATE: 68 BPM | RESPIRATION RATE: 17 BRPM

## 2025-01-26 LAB
ANION GAP SERPL CALCULATED.3IONS-SCNC: 11 MMOL/L (ref 5–15)
BUN SERPL-MCNC: 19 MG/DL (ref 9–23)
BUN/CREAT SERPL: 27.5 (ref 10–20)
CALCIUM SERPL-MCNC: 8.4 MG/DL (ref 8.7–10.4)
CHLORIDE SERPL-SCNC: 112 MMOL/L (ref 98–107)
CO2 SERPL-SCNC: 18 MMOL/L (ref 20–31)
GLUCOSE SERPL-MCNC: 199 MG/DL (ref 74–106)
HCT VFR BLD AUTO: 35.2 % (ref 41–53)
HGB BLD-MCNC: 11.6 G/DL (ref 13.5–17.5)
MCH RBC QN AUTO: 28.8 PG (ref 28–32)
MCV RBC AUTO: 87.2 FL (ref 80–100)
NRBC BLD QL AUTO: 0.2 %
POTASSIUM SERPL-SCNC: 4 MMOL/L (ref 3.5–5.1)
SODIUM SERPL-SCNC: 141 MMOL/L (ref 136–145)

## 2025-01-26 RX ADMIN — POTASSIUM CHLORIDE AND SODIUM CHLORIDE SCH MLS/HR: 450; 150 INJECTION, SOLUTION INTRAVENOUS at 13:00

## 2025-01-26 RX ADMIN — MORPHINE SULFATE PRN MG: 4 INJECTION, SOLUTION INTRAMUSCULAR; INTRAVENOUS at 11:29

## 2025-01-26 NOTE — DVHPN2
Subjective


Worsening abdominal pain


Reviewed:  Care Plan, H&P, Labs, Medications, Previous Orders, Radiology, Other 

(Consultation)


Changes from previous H/P or p:  Changes





Objective


Vitals





Vital Signs








  Date Time  Temp Pulse Resp B/P (MAP) Pulse Ox O2 Delivery O2 Flow Rate FiO2


 


1/26/25 08:31  71 18 129/60    


 


1/26/25 05:00 97.9    97   





 97.9       


 


1/25/25 20:00      Nasal Cannula* 2 28








Intake/Output











                               Intake and Output 


 


 1/26/25





 07:00


 


Intake Total 1250 ml


 


Output Total 1150 ml


 


Balance 100 ml


 


 


 


Intake Oral 0 ml


 


IV Total 1250 ml


 


Output Urine Total 1150 ml








General Appearance:  Alert, Oriented X3, Cooperative, mild distress


HEENT:  Atraumatic


Lungs:  Clear to auscultation, Normal air movement


Cardiovascular:  Regular rate, Normal S1, Normal S2


Abdomen:  Other (Hypoactive bowel sounds; two PEG drain with serosanguineous 

fluid; colostomy in place; diffusely tender abdomen)


Genitourinary:  Other ([facility])


Neuro:  Normal speech, Cranial nerves 3-12 NL


Psych/Mental Status:  Mental status NL, Mood NL


Medications





                               Current Medications








 Medications  Dose


 Ordered  Sig/Jennifer


 Route  Start Time


 Stop Time Status Last Admin


Dose Admin


 


 Pantoprazole


 Sodium  40 mg  DAILY


 IV  1/24/25 10:00


    1/26/25 09:10


40 MG


 


 Metronidazole  100 ml @ 


 100 mls/hr  Q8HR


 IV  1/24/25 14:00


    1/26/25 05:47


100 MLS/HR


 


 Ceftriaxone Sodium  50 ml @ 


 100 mls/hr  DAILY@09


 IV  1/24/25 09:00


    1/26/25 09:10


100 MLS/HR


 


 Diagnostic Test


  (Pha)  1 strip  Q6HR


 VI  1/24/25 06:00


    1/26/25 05:55


1 STRIP


 


 Insulin Human


 Regular    Q6HR


 SC  1/24/25 06:00


    1/26/25 05:56


3 UNITS


 


 Dextrose  50 ml  UD  PRN


 IV  1/24/25 04:15


     





 


 Acetaminophen/


 Hydrocodone Bitart  1 tab  Q4HP  PRN


 PO  1/24/25 04:15


    1/26/25 09:21


1 TAB


 


 Ondansetron HCl  4 mg  Q4HP  PRN


 IV  1/24/25 04:15


     





 


 Docusate Sodium  100 mg  BIDPRN  PRN


 PO  1/24/25 04:15


     





 


 Acetaminophen  650 mg  Q6HP  PRN


 PO  1/24/25 04:15


     





 


 Nitroglycerin  0.4 mg  Q5MINP  PRN


 SL  1/24/25 04:30


     





 


 Morphine Sulfate  2 mg  Q30M  PRN


 IV  1/24/25 04:30


     





 


 Morphine Sulfate  4 mg  Q4HPRN  PRN


 IV  1/24/25 13:15


    1/26/25 08:31


4 MG


 


 Sodium Chloride  1,000 ml @ 


 100 mls/hr  Q10H


 IV  1/24/25 18:15


    1/26/25 10:24


100 MLS/HR


 


 Phenol/Menthol  1 spr  Q2HP  PRN


 MT  1/25/25 13:30


     














Laboratory Results


Laboratory Tests


1/26/25 05:45











Chemistry








Test


 1/26/25


05:45


 


Calcium Level


 8.4 mg/dL


(8.7-10.4)  L








Microbiology





                                  Microbiology








 Date/Time


Source Procedure


Growth Status





 


 1/24/25 17:20


Anus Gram Stain


Pending Resulted





 1/24/25 17:20


Anus Anaerobic Culture - Preliminary


 Resulted


 


 1/24/25 17:20


Anus Aerobic Culture - Preliminary


 Resulted


 


 1/24/25 03:40


Blood Blood Culture - Preliminary


NO GROWTH AFTER 48 HOURS OF INCUBATION. Resulted








Labs and/or images reviewed:  Labs reviewed by me, Image(s) reviewed by me





Assessment/Plan


Assessment/Plan


Covering Dr. Abdi:








#Sepsis with leukocytosis; due to E coli infection associated with colonic 

perforation at the abdominal wall of colostomy with fecal contamination in the 

abdominal wall at the colostomy site; present on admission; status post 

exploratory laparotomy with resection of necrotic and perforated colostomy with 

colostomy revision on January 24, 2025


#Abdominal wall cellulitis due to E coli.


#Abdominal pain due to above


#CK; most likely vasomotor nephropathy in the setting of sepsis


#Hypokalemia; due to GI losses


#History of colon cancer status post resection; imaging studies showing lung 

nodule and liver nodule


#Diabetes mellitus type 2 with hyperglycemia due to sepsis


#Hypertensive heart disease


#Normocytic anemia; most likely inflammatory


#Morbid obesity


To keep NPO as per surgery


Adjust pain management as indicated


Continue IV fluids


Continue IV antibiotics


Continue insulin sliding scale with hypoglycemia protocol


Reviewed cultures


Reviewed imaging studies and blood work


Avoid nephrotoxic agents


Replace electrolytes as indicated


Surgery is following


On GI and DVT prophylaxis


Provided the patient's wife with a copy of the imaging study showing lung nodule

and liver nodule in order discuss with outpatient oncologist upon discharge; to 

rule out metastasis 


Continue monitoring








Late Entry.





This medical document was created using an electronic medical record system with

computerized dictation system.  Although this document has been carefully 

reviewed, there might still be some phonetic and typographical errors.  These 

areas are purely typographical due to imperfections of the software programs, 

and do not reflect any compromise in the patient's medical care.


Plan discussed with:  Patient, Other (Nurse)





Date of Service:  Jan 26, 2025


Billing Provider:  RICARDO LEZAMA MD


Common Visit Codes:  [id number] INP/OBS CARE(HIGH)











RICARDO LEZAMA MD             Jan 26, 2025 10:34

## 2025-01-26 NOTE — DVHPN2
Progress Note - Dictate


Date Seen:  Jan 26, 2025


Medical Necessity Reason


Pt with a Central, PICC or Fol:  No


Subjective


E:  no major events o/n.





still c/o abdominal pain.


vital signs





                                   Vital Sign








  Date Time  Temp Pulse Resp B/P (MAP) Pulse Ox O2 Delivery O2 Flow Rate FiO2


 


1/26/25 11:29  78 18 129/65    


 


1/26/25 08:00     94 Nasal Cannula* 2 28


 


1/26/25 05:00 97.9       





 97.9       














                           Total Intake and Output   


 


 1/25/25 1/25/25 1/26/25





 15:00 23:00 07:00


 


Intake Total 50 ml 100 ml 1100 ml


 


Output Total  550 ml 600 ml


 


Balance 50 ml -450 ml 500 ml








medications





                               Current Medications








 Medications  Dose


 Ordered  Sig/Jennifer


 Route  Start Time


 Stop Time Status Last Admin


Dose Admin


 


 Pantoprazole


 Sodium  40 mg  DAILY


 IV  1/24/25 10:00


    1/26/25 09:10


40 MG


 


 Metronidazole  100 ml @ 


 100 mls/hr  Q8HR


 IV  1/24/25 14:00


    1/26/25 05:47


100 MLS/HR


 


 Ceftriaxone Sodium  50 ml @ 


 100 mls/hr  DAILY@09


 IV  1/24/25 09:00


    1/26/25 09:10


100 MLS/HR


 


 Diagnostic Test


  (Pha)  1 strip  Q6HR


 VI  1/24/25 06:00


    1/26/25 11:41


1 STRIP


 


 Insulin Human


 Regular    Q6HR


 SC  1/24/25 06:00


    1/26/25 11:41


4 UNITS


 


 Dextrose  50 ml  UD  PRN


 IV  1/24/25 04:15


     





 


 Ondansetron HCl  4 mg  Q4HP  PRN


 IV  1/24/25 04:15


     





 


 Docusate Sodium  100 mg  BIDPRN  PRN


 PO  1/24/25 04:15


     





 


 Acetaminophen  650 mg  Q6HP  PRN


 PO  1/24/25 04:15


     





 


 Nitroglycerin  0.4 mg  Q5MINP  PRN


 SL  1/24/25 04:30


     





 


 Morphine Sulfate  2 mg  Q30M  PRN


 IV  1/24/25 04:30


     





 


 Sodium Chloride  1,000 ml @ 


 100 mls/hr  Q10H


 IV  1/24/25 18:15


    1/26/25 10:24


100 MLS/HR


 


 Phenol/Menthol  1 spr  Q2HP  PRN


 MT  1/25/25 13:30


     





 


 Morphine Sulfate  4 mg  Q3HP  PRN


 IV  1/26/25 11:15


    1/26/25 11:29


4 MG








objective


GEN:  NAD





ABD:  surgical incisions clean.  min serosang drainage from the prev colostomy 

site.  PEG min serosang drainage.


laboratory and microbiology


                                Laboratory Tests


1/26/25 05:45

















Test


 1/26/25


05:45 Range/Units


 


 


Serum Glucose 199 #H   mg/dL








Assessment/Plan


A:


1.  POD #2





P:


1.  up to chair


Plan discussed with:  Patient











ANIKA MONTERROSO MD                 Jan 26, 2025 12:54

## 2025-01-27 VITALS
SYSTOLIC BLOOD PRESSURE: 133 MMHG | HEART RATE: 73 BPM | DIASTOLIC BLOOD PRESSURE: 71 MMHG | RESPIRATION RATE: 18 BRPM | TEMPERATURE: 98.4 F | OXYGEN SATURATION: 95 %

## 2025-01-27 VITALS
SYSTOLIC BLOOD PRESSURE: 157 MMHG | HEART RATE: 75 BPM | TEMPERATURE: 98.5 F | OXYGEN SATURATION: 98 % | RESPIRATION RATE: 19 BRPM | DIASTOLIC BLOOD PRESSURE: 70 MMHG

## 2025-01-27 VITALS
RESPIRATION RATE: 18 BRPM | HEART RATE: 82 BPM | OXYGEN SATURATION: 96 % | TEMPERATURE: 98.2 F | DIASTOLIC BLOOD PRESSURE: 77 MMHG | SYSTOLIC BLOOD PRESSURE: 134 MMHG

## 2025-01-27 VITALS — HEART RATE: 69 BPM | OXYGEN SATURATION: 94 % | RESPIRATION RATE: 16 BRPM

## 2025-01-27 VITALS
SYSTOLIC BLOOD PRESSURE: 144 MMHG | RESPIRATION RATE: 18 BRPM | DIASTOLIC BLOOD PRESSURE: 76 MMHG | HEART RATE: 78 BPM | OXYGEN SATURATION: 96 % | TEMPERATURE: 98.6 F

## 2025-01-27 VITALS
DIASTOLIC BLOOD PRESSURE: 75 MMHG | TEMPERATURE: 98 F | HEART RATE: 69 BPM | OXYGEN SATURATION: 94 % | RESPIRATION RATE: 18 BRPM | SYSTOLIC BLOOD PRESSURE: 144 MMHG

## 2025-01-27 VITALS — HEART RATE: 79 BPM | RESPIRATION RATE: 18 BRPM

## 2025-01-27 LAB
ALBUMIN SERPL-MCNC: 3.3 G/DL (ref 3.2–4.8)
ANION GAP SERPL CALCULATED.3IONS-SCNC: 12 MMOL/L (ref 5–15)
BUN SERPL-MCNC: 10 MG/DL (ref 9–23)
BUN/CREAT SERPL: 16.4 (ref 10–20)
CALCIUM SERPL-MCNC: 8.8 MG/DL (ref 8.7–10.4)
CHLORIDE SERPL-SCNC: 106 MMOL/L (ref 98–107)
CO2 SERPL-SCNC: 21 MMOL/L (ref 20–31)
GLUCOSE SERPL-MCNC: 155 MG/DL (ref 74–106)
HCT VFR BLD AUTO: 32.2 % (ref 41–53)
HGB BLD-MCNC: 11 G/DL (ref 13.5–17.5)
MAGNESIUM SERPL-MCNC: 1.5 MG/DL (ref 1.6–2.6)
MCH RBC QN AUTO: 29.1 PG (ref 28–32)
MCV RBC AUTO: 84.7 FL (ref 80–100)
NRBC BLD QL AUTO: 0 %
POTASSIUM SERPL-SCNC: 3.3 MMOL/L (ref 3.5–5.1)
SODIUM SERPL-SCNC: 139 MMOL/L (ref 136–145)

## 2025-01-27 RX ADMIN — HUMAN INSULIN SCH UNITS: 100 INJECTION, SOLUTION SUBCUTANEOUS at 18:00

## 2025-01-27 RX ADMIN — LEUCINE, PHENYLALANINE, LYSINE, METHIONINE, ISOLEUCINE, VALINE, HISTIDINE, THREONINE, TRYPTOPHAN, ALANINE, GLYCINE, ARGININE, PROLINE, SERINE, TYROSINE, DEXTROSE SCH MLS/HR: 311; 238; 247; 170; 255; 247; 204; 179; 77; 880; 438; 489; 289; 213; 17; 10 INJECTION INTRAVENOUS at 22:24

## 2025-01-27 RX ADMIN — LISINOPRIL ONE MG: 5 TABLET ORAL at 08:45

## 2025-01-27 RX ADMIN — ENOXAPARIN SODIUM SCH MG: 40 INJECTION SUBCUTANEOUS at 09:14

## 2025-01-27 RX ADMIN — Medication SCH STRIP: at 18:00

## 2025-01-27 RX ADMIN — MAGNESIUM SULFATE IN DEXTROSE ONE MLS/HR: 10 INJECTION, SOLUTION INTRAVENOUS at 16:10

## 2025-01-27 RX ADMIN — POTASSIUM CHLORIDE ONE MLS/HR: 200 INJECTION, SOLUTION INTRAVENOUS at 10:50

## 2025-01-27 NOTE — DVHPN2
Progress Note - Dictate


Date Seen:  Jan 27, 2025


Medical Necessity Reason


Pt with a Central, PICC or Fol:  No


Subjective


E:  no major events o/n.





still c/o abdominal pain but better than yesterday.


vital signs





                                   Vital Sign








  Date Time  Temp Pulse Resp B/P (MAP) Pulse Ox O2 Delivery O2 Flow Rate FiO2


 


1/27/25 05:00 98.5 75 19 157/70 (99) 98   





 98.5       


 


1/26/25 20:00      Nasal Cannula* 2 28














                           Total Intake and Output   


 


 1/26/25 1/26/25 1/27/25





 15:00 23:00 07:00


 


Intake Total  100 ml 1100 ml


 


Output Total  750 ml 850 ml


 


Balance  -650 ml 250 ml








medications





                               Current Medications








 Medications  Dose


 Ordered  Sig/Jnenifer


 Route  Start Time


 Stop Time Status Last Admin


Dose Admin


 


 Pantoprazole


 Sodium  40 mg  DAILY


 IV  1/24/25 10:00


    1/26/25 09:10


40 MG


 


 Metronidazole  100 ml @ 


 100 mls/hr  Q8HR


 IV  1/24/25 14:00


    1/27/25 05:57


100 MLS/HR


 


 Ceftriaxone Sodium  50 ml @ 


 100 mls/hr  DAILY@09


 IV  1/24/25 09:00


    1/26/25 09:10


100 MLS/HR


 


 Diagnostic Test


  (Pha)  1 strip  Q6HR


 VI  1/24/25 06:00


    1/27/25 05:57


1 STRIP


 


 Insulin Human


 Regular    Q6HR


 SC  1/24/25 06:00


    1/27/25 06:01


2 UNITS


 


 Dextrose  50 ml  UD  PRN


 IV  1/24/25 04:15


     





 


 Ondansetron HCl  4 mg  Q4HP  PRN


 IV  1/24/25 04:15


     





 


 Docusate Sodium  100 mg  BIDPRN  PRN


 PO  1/24/25 04:15


     





 


 Acetaminophen  650 mg  Q6HP  PRN


 PO  1/24/25 04:15


     





 


 Nitroglycerin  0.4 mg  Q5MINP  PRN


 SL  1/24/25 04:30


     





 


 Morphine Sulfate  2 mg  Q30M  PRN


 IV  1/24/25 04:30


     





 


 Phenol/Menthol  1 spr  Q2HP  PRN


 MT  1/25/25 13:30


     





 


 Morphine Sulfate  4 mg  Q3HP  PRN


 IV  1/26/25 11:15


    1/27/25 03:48


4 MG


 


 Potassium


 Chloride/Sodium


 Chloride  1,000 ml @ 


 100 mls/hr  Q10H


 IV  1/26/25 13:00


    1/27/25 02:58


100 MLS/HR


 


 Enoxaparin Sodium  40 mg  DAILY


 SC  1/27/25 10:00


     











objective


GEN:  NAD





ABD:  surgical incisions clean.  min semi-feculent drainage from the prev 

colostomy site.  PEG min output.


laboratory and microbiology


                                Laboratory Tests


1/27/25 05:32

















Test


 1/27/25


05:32 Range/Units


 


 


Serum Glucose 155 H   mg/dL








Assessment/Plan


A:


1.  POD #3





P:


1.  dc simón


2.  up to chair!


Plan discussed with:  Patient











ANIKA MONTERROSO MD                 Jan 27, 2025 07:53

## 2025-01-27 NOTE — DVHPNRES
Progress Note


Date Seen:  2025


Resident Creating Document:  MARV LOERA RESIDENT


Medical Necessity Reason


Pt with a Central, PICC or Fol:  No


Medical Necessity Reason


POD #3


Abdominal pain





Subjective


Review of Systems


This is a 51 year old male with a past medical history of colon cancer s/p 

section in 2015 and colostomy bag, diabetes mellitus, hypertension. He presented

to [facility] ED with complaint of acute abdominal pain. Patient 

said that he has been having the pain for the past 2 weeks that is generalized 

and associated with loss of  appetite, but without nausea or vomiting.  Patient 

also mentioned that he skin around the colostomy is red and the colostomy bag 

has some amount of blood in it. Patient denied chest pain, headache, dizziness, 

diaphoresis, shortness of breath, nausea, vomiting, fever, chill. Vitals are 

temperature 99.1, pulse 111, RR:16 and Blood pressure 128/71. Laboratory data 

showed WBC 11.3, platelets 358, sodium 127, potassium 3.1, BUN 11, creatinine 

1.17, GFR 72, glucose 644, lipase 32. Abdomen/pelvis CT revealing solid nodules 

measuring 9.5 mm is seen in the left lower lobe, and enhancing lesion measuring 

10 mm seen in the right lobe of liver suggestive of hemangioma or materials; 

colostomy is noted in the left lower quadrant with dilated bowel loops seen in 

the colostomy measuring a proximally 6.3 cm, appears to be loaded with feces; 

mild fat stranding is noted in the adjacent subcutaneous fat with extraluminal 

air foci questionable for perforation. Patient started IV antibiotic regimen 

Flagyl, 





PN 2025:  Patient is seen and examined today at the bedside.  He is status 

post surgery.  He has NG tube placed draining abdominal contents. He is on pain 

medication on board.  Be 0.  Labs today showed WBC of 9.3, chloride 107 carbon 

dioxide 17 his, glucose 389.  Patient currently patient is currently on 

ceftriaxone and metronidazole and also morphine 4 mg Q 4 H p.r.n.





PN 2025: Patient seen and examined. He was lying in bed with NG tube. Still

NPO. He has no complaints. Labs value showed wbc: 8.7, K: 3.2, m.5.  gr 

dc and patient is working with PT.





Objective


vital signs





                                   Vital Sign








  Date Time  Temp Pulse Resp B/P (MAP) Pulse Ox O2 Delivery O2 Flow Rate FiO2


 


25 17:01  73 16 133/71    


 


25 17:00 98.4    95   





 98.4       


 


25 08:00      Nasal Cannula* 2 28














                           Total Intake and Output   


 


 25





 15:00 23:00 07:00


 


Intake Total  100 ml 1100 ml


 


Output Total  750 ml 850 ml


 


Balance  -650 ml 250 ml








medications





                               Current Medications








 Medications  Dose


 Ordered  Sig/Jennifer


 Route  Start Time


 Stop Time Status Last Admin


Dose Admin


 


 Pantoprazole


 Sodium  40 mg  DAILY


 IV  25 10:00


    25 09:13


40 MG


 


 Metronidazole  100 ml @ 


 100 mls/hr  Q8HR


 IV  25 14:00


    25 13:55


100 MLS/HR


 


 Ceftriaxone Sodium  50 ml @ 


 100 mls/hr  DAILY@09


 IV  25 09:00


    25 09:13


100 MLS/HR


 


 Ondansetron HCl  4 mg  Q4HP  PRN


 IV  25 04:15


     





 


 Docusate Sodium  100 mg  BIDPRN  PRN


 PO  25 04:15


     





 


 Acetaminophen  650 mg  Q6HP  PRN


 PO  25 04:15


     





 


 Nitroglycerin  0.4 mg  Q5MINP  PRN


 SL  25 04:30


     





 


 Morphine Sulfate  2 mg  Q30M  PRN


 IV  25 04:30


     





 


 Phenol/Menthol  1 spr  Q2HP  PRN


 MT  25 13:30


     





 


 Morphine Sulfate  4 mg  Q3HP  PRN


 IV  25 11:15


    25 16:31


4 MG


 


 Potassium


 Chloride/Sodium


 Chloride  1,000 ml @ 


 100 mls/hr  Q10H


 IV  25 13:00


    25 09:00


100 MLS/HR


 


 Enoxaparin Sodium  40 mg  DAILY


 SC  25 10:00


    25 09:14


40 MG


 


 Amino Acids  0 ml @ 0


 mls/hr  PER  PHARMACY


 IV  25 12:15


     





 


 Diagnostic Test


  (Pha)  1 strip  Q6HR


 VI  25 18:00


    25 18:00


1 STRIP


 


 Insulin Human


 Regular  FOLLOW


 SLIDING


 SCALE  Q6HR


 SC  25 18:00


     





 


 Dextrose  50 ml  UD


 IV  25 12:45


     





 


 Amino Acids/


 Electrolytes/


 Dextrose  1,000 ml @ 


 41 mls/hr  DAILY@2200


 IV  25 22:00


     











Examination


General Appearance:  Alert, Oriented X3, Cooperative, abdominal pain with mild 

distress, NG tube placed, fluids running


HEENT:  Atraumatic, PERRLA, EOMI, Mucous membrane moist/pink


Respiratory:  Clear to auscultation, Normal air movement


Cardiovascular:  Regular rate, Normal S1, Normal S2, No murmurs, no chest wall 

tenderness


Abdominal: Status post surgery. staples noted, drains seen. pain is passing gas,

gr dc


Extremities:  No clubbing, No cyanosis, No edema, Normal pulses, No 

tenderness/swelling


Skin:  No rashes, No breakdown, No significant lesion


Neuro:  Normal gait, Normal speech, Strength at 5/5 X4 ext, Normal tone, 

Sensation intact, Cranial nerves 3-12 NL, Reflexes 2+


Psych/Mental Status:  Mental status NL, Mood NL


laboratory and microbiology


                                Laboratory Tests


25 05:32

















Test


 25


05:32 Range/Units


 


 


Serum Glucose 155 H   mg/dL








                                  Microbiology








 Date/Time


Source Procedure


Growth Status





 


 25 17:20


Anus Gram Stain - Final


 Resulted





 25 17:20


Anus Anaerobic Culture - Preliminary


 Resulted


 


 25 17:20 Aerobic Culture - Final


Escherichia coli


Klebsiella pneumoniae


Enterococcus  faecalis Resulted


 


 25 03:40


Blood Blood Culture - Preliminary


NO GROWTH AFTER 72 HOURS OF INCUBATION. Resulted











Problem List/Assessment/Plan


Problem List/Assessment/Plan


Assessment


Sepsis due to  colonic perforation/abdominal wall cellulitis; present on 

Admission


Colonic perforation within the abdominal wall of the colostomy site likely 

secondary to necrotic colon


--> s/p Exploratory laparotomy with resection of necrotic and perforated 

colostomy with colostomy revision


Abdominal wall cellulitis.


hypokalemia


hyponatremia


Leukocytosis


History of colon cancer status post resection 


Possible metastasis to the lungs and liver, by CT report


Diabetes mellitus


Hypertension.


Mild anemia








plan 


Antibiotics: Metronidazole and ceftriaxone 


NPO, 


IV fluid


Replace electrolytes


Daily CBC and CMP


Surgical team following


GI following


climinix per pharmacy


To follow up with Dr. Arredondo about the new findings in lung and liver





Code status: Full


Goal of care discussed for more than 35 minutes


Case and plan discussed and reviewed with Dr. soto


Plan discussed with:  Patient





My Orders


My Orders





                         Orders - MARV LOERA RESIDENT








Procedure Category Date Status





  Time 


 


Clinimix Per Pharmacy PHA 25 In Process





  12:15 


 


Glucose Blood PHA 25 In Process





 (Accu-Chek Comfort  18:00 


 


Insulin R (Human) PHA 25 In Process





 (Insulin R)  18:00 


 


Dextrose 50% Syringe PHA 25 In Process





  12:45 


 


Amino Acid Infusion PHA 25 In Process





In D10w (Clinimix 4.  22:00 


 


Comprehensive LAB 25 Verified





Metabolic Panel  04:00 


 


Magnesium LAB 25 Verified





  04:00 


 


Phosphorus LAB 25 Verified





  04:00 


 


Triglycerides LAB 25 Verified





  04:00 


 


Clinimix Per Pharmacy ALLY 25 In Process





  22:00 











Date of Service:  2025


Billing Provider:  BERNARDO SOTO MD


Common Visit Codes:  [id number] INP/OBS CARE(HIGH)











MARV LOERA RESIDENT          2025 19:35


BERNARDO SOTO MD             2025 10:06

## 2025-01-28 VITALS
TEMPERATURE: 98 F | RESPIRATION RATE: 18 BRPM | HEART RATE: 75 BPM | SYSTOLIC BLOOD PRESSURE: 132 MMHG | DIASTOLIC BLOOD PRESSURE: 74 MMHG | OXYGEN SATURATION: 94 %

## 2025-01-28 VITALS
SYSTOLIC BLOOD PRESSURE: 117 MMHG | RESPIRATION RATE: 18 BRPM | HEART RATE: 54 BPM | OXYGEN SATURATION: 99 % | TEMPERATURE: 97.8 F | DIASTOLIC BLOOD PRESSURE: 69 MMHG

## 2025-01-28 VITALS — OXYGEN SATURATION: 96 % | RESPIRATION RATE: 19 BRPM | HEART RATE: 86 BPM

## 2025-01-28 VITALS
TEMPERATURE: 97.5 F | SYSTOLIC BLOOD PRESSURE: 131 MMHG | OXYGEN SATURATION: 95 % | DIASTOLIC BLOOD PRESSURE: 75 MMHG | HEART RATE: 82 BPM | RESPIRATION RATE: 20 BRPM

## 2025-01-28 VITALS
RESPIRATION RATE: 18 BRPM | OXYGEN SATURATION: 94 % | DIASTOLIC BLOOD PRESSURE: 71 MMHG | HEART RATE: 83 BPM | TEMPERATURE: 97.9 F | SYSTOLIC BLOOD PRESSURE: 131 MMHG

## 2025-01-28 VITALS
OXYGEN SATURATION: 94 % | TEMPERATURE: 98.4 F | DIASTOLIC BLOOD PRESSURE: 70 MMHG | HEART RATE: 83 BPM | SYSTOLIC BLOOD PRESSURE: 131 MMHG | RESPIRATION RATE: 18 BRPM

## 2025-01-28 VITALS — RESPIRATION RATE: 18 BRPM

## 2025-01-28 VITALS
RESPIRATION RATE: 20 BRPM | TEMPERATURE: 97.5 F | HEART RATE: 99 BPM | DIASTOLIC BLOOD PRESSURE: 70 MMHG | SYSTOLIC BLOOD PRESSURE: 150 MMHG | OXYGEN SATURATION: 93 %

## 2025-01-28 VITALS — HEART RATE: 79 BPM

## 2025-01-28 LAB
ALBUMIN SERPL-MCNC: 3.3 G/DL (ref 3.2–4.8)
ALP SERPL-CCNC: 78 U/L (ref 46–116)
ALT SERPL-CCNC: < 9 U/L (ref 7–40)
ANION GAP SERPL CALCULATED.3IONS-SCNC: 8 MMOL/L (ref 5–15)
BILIRUB SERPL-MCNC: 0.3 MG/DL (ref 0.2–1)
BUN SERPL-MCNC: 7 MG/DL (ref 9–23)
BUN/CREAT SERPL: 10.9 (ref 10–20)
CALCIUM SERPL-MCNC: 8.6 MG/DL (ref 8.7–10.4)
CHLORIDE SERPL-SCNC: 104 MMOL/L (ref 98–107)
CO2 SERPL-SCNC: 22 MMOL/L (ref 20–31)
GLUCOSE SERPL-MCNC: 228 MG/DL (ref 74–106)
HCT VFR BLD AUTO: 34.6 % (ref 41–53)
HGB BLD-MCNC: 11.8 G/DL (ref 13.5–17.5)
MAGNESIUM SERPL-MCNC: 1.7 MG/DL (ref 1.6–2.6)
MCH RBC QN AUTO: 29.6 PG (ref 28–32)
MCV RBC AUTO: 87 FL (ref 80–100)
NRBC BLD QL AUTO: 0.1 %
POTASSIUM SERPL-SCNC: 3.3 MMOL/L (ref 3.5–5.1)
PROT SERPL-MCNC: 5.7 G/DL (ref 5.7–8.2)
SODIUM SERPL-SCNC: 134 MMOL/L (ref 136–145)
TRIGL SERPL-MCNC: 83 MG/DL (ref ?–150)

## 2025-01-28 PROCEDURE — B54NZZA ULTRASONOGRAPHY OF LEFT UPPER EXTREMITY VEINS, GUIDANCE: ICD-10-PCS | Performed by: INTERNAL MEDICINE

## 2025-01-28 PROCEDURE — 05HC33Z INSERTION OF INFUSION DEVICE INTO LEFT BASILIC VEIN, PERCUTANEOUS APPROACH: ICD-10-PCS | Performed by: INTERNAL MEDICINE

## 2025-01-28 RX ADMIN — HUMAN INSULIN ONE MLS/HR: 100 INJECTION, SOLUTION SUBCUTANEOUS at 07:15

## 2025-01-28 NOTE — DVHPNRES
Progress Note


Date Seen:  2025


Resident Creating Document:  MARV LOERA RESIDENT


Medical Necessity Reason


Pt with a Central, PICC or Fol:  No


Medical Necessity Reason


POD #4


Exploratory laparotomy with resection of necrotic and perforated colostomy with 

colostomy revision





Subjective


Review of Systems


This is a 51 year old male with a past medical history of colon cancer s/p 

section in 2015 and colostomy bag, diabetes mellitus, hypertension. He presented

to [facility] ED with complaint of acute abdominal pain. Patient 

said that he has been having the pain for the past 2 weeks that is generalized 

and associated with loss of  appetite, but without nausea or vomiting.  Patient 

also mentioned that he skin around the colostomy is red and the colostomy bag 

has some amount of blood in it. Patient denied chest pain, headache, dizziness, 

diaphoresis, shortness of breath, nausea, vomiting, fever, chill. Vitals are 

temperature 99.1, pulse 111, RR:16 and Blood pressure 128/71. Laboratory data 

showed WBC 11.3, platelets 358, sodium 127, potassium 3.1, BUN 11, creatinine 

1.17, GFR 72, glucose 644, lipase 32. Abdomen/pelvis CT revealing solid nodules 

measuring 9.5 mm is seen in the left lower lobe, and enhancing lesion measuring 

10 mm seen in the right lobe of liver suggestive of hemangioma or materials; 

colostomy is noted in the left lower quadrant with dilated bowel loops seen in 

the colostomy measuring a proximally 6.3 cm, appears to be loaded with feces; 

mild fat stranding is noted in the adjacent subcutaneous fat with extraluminal 

air foci questionable for perforation. Patient started IV antibiotic regimen 

Flagyl, 





PN 2025:  Patient is seen and examined today at the bedside.  He is status 

post surgery.  He has NG tube placed draining abdominal contents. He is on pain 

medication on board.  Be 0.  Labs today showed WBC of 9.3, chloride 107 carbon 

dioxide 17 his, glucose 389.  Patient currently patient is currently on 

ceftriaxone and metronidazole and also morphine 4 mg Q 4 H p.r.n.





PN 2025: Patient seen and examined. He was lying in bed with NG tube. Still

NPO. He has no complaints. Labs value showed wbc: 8.7, K: 3.2, m.5.  simón lockwood and patient is working with PT.





PN 2025: Patient seen and examined. He was lying in bed with NG tube. Still

in pain. Incision site is clean. but old stoma site still drains some 

serosanguineous fluid. He is on morphine 4mg q3hrs prn. Labs shows Wbc  7.6, K: 

3.3, mg 1.7. He is on clinimix and PT is working with him. Will continue to 

monitor him closely. Give adequate pain medication and continue to try to get 

him out of bed.





Objective


vital signs





                                   Vital Sign








  Date Time  Temp Pulse Resp B/P (MAP) Pulse Ox O2 Delivery O2 Flow Rate FiO2


 


25 12:32  81 19 150/70    


 


25 11:53 97.5    93   





 97.5       


 


25 08:00      Room Air* 0 21














                           Total Intake and Output   


 


 25





 15:00 23:00 07:00


 


Intake Total 100 ml 100 ml 1100 ml


 


Output Total  1320 ml 850 ml


 


Balance 100 ml -1220 ml 250 ml








medications





                               Current Medications








 Medications  Dose


 Ordered  Sig/Jennifer


 Route  Start Time


 Stop Time Status Last Admin


Dose Admin


 


 Pantoprazole


 Sodium  40 mg  DAILY


 IV  25 10:00


    25 08:55


40 MG


 


 Metronidazole  100 ml @ 


 100 mls/hr  Q8HR


 IV  25 14:00


    25 14:28


100 MLS/HR


 


 Ceftriaxone Sodium  50 ml @ 


 100 mls/hr  DAILY@09


 IV  25 09:00


    25 08:56


100 MLS/HR


 


 Ondansetron HCl  4 mg  Q4HP  PRN


 IV  25 04:15


     





 


 Docusate Sodium  100 mg  BIDPRN  PRN


 PO  25 04:15


     





 


 Acetaminophen  650 mg  Q6HP  PRN


 PO  25 04:15


     





 


 Nitroglycerin  0.4 mg  Q5MINP  PRN


 SL  25 04:30


     





 


 Morphine Sulfate  2 mg  Q30M  PRN


 IV  25 04:30


     





 


 Phenol/Menthol  1 spr  Q2HP  PRN


 MT  25 13:30


     





 


 Morphine Sulfate  4 mg  Q3HP  PRN


 IV  25 11:15


    25 12:32


4 MG


 


 Potassium


 Chloride/Sodium


 Chloride  1,000 ml @ 


 100 mls/hr  Q10H


 IV  25 13:00


    1/28/25 05:23


100 MLS/HR


 


 Enoxaparin Sodium  40 mg  DAILY


 SC  25 10:00


    25 08:56


40 MG


 


 Amino Acids  0 ml @ 0


 mls/hr  PER  PHARMACY


 IV  25 12:15


     





 


 Diagnostic Test


  (Pha)  1 strip  Q6HR


 VI  25 18:00


    25 11:25


1 STRIP


 


 Insulin Human


 Regular  FOLLOW


 SLIDING


 SCALE  Q6HR


 SC  25 18:00


    25 11:25


2 UNITS


 


 Dextrose  50 ml  UD


 IV  25 12:45


     





 


 Amino Acids/


 Electrolytes/


 Dextrose  1,000 ml @ 


 41 mls/hr  DAILY@2200


 IV  25 22:00


    25 22:24


41 MLS/HR








Examination


General Appearance:  Alert, Oriented X3, Cooperative, abdominal pain with mild 

distress, NG tube placed, very minimal gastric juice drainage today


HEENT:  Atraumatic, PERRLA, EOMI, Mucous membrane moist/pink


Respiratory:  Clear to auscultation, Normal air movement


Cardiovascular:  Regular rate, Normal S1, Normal S2, No murmurs, no chest wall 

tenderness


Abdominal: Status post surgery. staples noted, drains seen. old stoma site still

drains, pain is passing gas, gr dc


Extremities:  No clubbing, No cyanosis, No edema, Normal pulses, No 

tenderness/swelling


Skin:  No rashes, No breakdown, No significant lesion


Neuro:  Normal gait, Normal speech, Strength at 5/5 X4 ext, Normal tone, 

Sensation intact, Cranial nerves 3-12 NL, Reflexes 2+


Psych/Mental Status:  Mental status NL, Mood NL


laboratory and microbiology


                                Laboratory Tests


25 05:50

















Test


 25


05:50 Range/Units


 


 


Serum Glucose 228 H   mg/dL








                                  Microbiology








 Date/Time


Source Procedure


Growth Status





 


 25 17:20


Anus Gram Stain - Final


 Complete





 25 17:20 Anaerobic Culture - Final


Prevotella loescheii


Bacteroides theitaiotaomicron Complete


 


 25 17:20 Aerobic Culture - Final


Escherichia coli


Klebsiella pneumoniae


Enterococcus  faecalis Complete


 


 25 03:40


Blood Blood Culture - Preliminary


NO GROWTH AFTER 72 HOURS OF INCUBATION. Resulted











Problem List/Assessment/Plan


Problem List/Assessment/Plan


Assessment


Sepsis due to  colonic perforation/abdominal wall cellulitis; present on 

Admission


Colonic perforation within the abdominal wall of the colostomy site likely 

secondary to necrotic colon


--> s/p Exploratory laparotomy with resection of necrotic and perforated 

colostomy with colostomy revision


Abdominal wall cellulitis.


hypokalemia


hyponatremia


Leukocytosis


History of colon cancer status post resection 


Possible metastasis to the lungs and liver, by CT report


Diabetes mellitus


Hypertension.


Mild anemia








plan 


Antibiotics: Metronidazole and ceftriaxone 


NPO, 


IV fluid


Replace electrolytes as necessary


Daily CBC and CMP


Surgical team following


GI following


Adequate pain medication; Morphine 4 mg q3hrs prn


climinix per pharmacy


To follow up with Dr. Arredondo about the new findings in lung and liver





Code status: Full


Goal of care discussed for more than 25 minutes


Case and plan discussed and reviewed with Dr. soto


Plan discussed with:  Patient





My Orders


My Orders





                         Orders - MARV LOERA








Procedure Category Date Status





  Time 


 


Comprehensive LAB 25 Verified





Metabolic Panel  04:00 


 


Magnesium LAB 25 Verified





  04:00 


 


Phosphorus LAB 25 Verified





  04:00 


 


Clinimix Per Pharmacy ALLY 25 In Process





  22:00 











Dietary Evaluation Review


Comments:  


1. Recommend TPN per pharmacy to meet 75% of pt's needs for protein and  


kcal. 





2. If EN/GI accessible, Glucerna 1.2 @50ml/hr will provide 72 gPro  


1440kcal supporting pt's needs at 80% pro and 80% of Kcal. 





3. If EN/GI accessible, PO feeding meedically feasible, may offer a  


CCHO-60 diet for DM control.


Expected Outcomes/Goals:  


improved healing and improved nutrition status, gradual weight loss.





Date of Service:  2025


Billing Provider:  BERNARDO SOTO MD


Common Visit Codes:  [id number] INP/OBS CARE(HIGH)











MARV LOERA          2025 15:12


BERNARDO SOTO MD             2025 09:56

## 2025-01-29 VITALS
TEMPERATURE: 97.9 F | SYSTOLIC BLOOD PRESSURE: 134 MMHG | OXYGEN SATURATION: 98 % | HEART RATE: 77 BPM | DIASTOLIC BLOOD PRESSURE: 75 MMHG | RESPIRATION RATE: 18 BRPM

## 2025-01-29 VITALS
SYSTOLIC BLOOD PRESSURE: 134 MMHG | HEART RATE: 94 BPM | DIASTOLIC BLOOD PRESSURE: 64 MMHG | OXYGEN SATURATION: 94 % | RESPIRATION RATE: 20 BRPM | TEMPERATURE: 98.3 F

## 2025-01-29 VITALS
TEMPERATURE: 98.4 F | RESPIRATION RATE: 20 BRPM | OXYGEN SATURATION: 94 % | SYSTOLIC BLOOD PRESSURE: 142 MMHG | DIASTOLIC BLOOD PRESSURE: 77 MMHG | HEART RATE: 87 BPM

## 2025-01-29 VITALS
TEMPERATURE: 98.2 F | SYSTOLIC BLOOD PRESSURE: 132 MMHG | OXYGEN SATURATION: 94 % | HEART RATE: 85 BPM | RESPIRATION RATE: 20 BRPM | DIASTOLIC BLOOD PRESSURE: 74 MMHG

## 2025-01-29 VITALS
RESPIRATION RATE: 18 BRPM | DIASTOLIC BLOOD PRESSURE: 70 MMHG | SYSTOLIC BLOOD PRESSURE: 136 MMHG | OXYGEN SATURATION: 94 % | TEMPERATURE: 98.3 F | HEART RATE: 83 BPM

## 2025-01-29 VITALS
SYSTOLIC BLOOD PRESSURE: 123 MMHG | TEMPERATURE: 98.4 F | RESPIRATION RATE: 18 BRPM | HEART RATE: 80 BPM | OXYGEN SATURATION: 94 % | DIASTOLIC BLOOD PRESSURE: 64 MMHG

## 2025-01-29 VITALS — HEART RATE: 92 BPM | RESPIRATION RATE: 18 BRPM

## 2025-01-29 VITALS — HEART RATE: 92 BPM

## 2025-01-29 LAB
ALBUMIN SERPL-MCNC: 3.2 G/DL (ref 3.2–4.8)
ALP SERPL-CCNC: 69 U/L (ref 46–116)
ALT SERPL-CCNC: < 9 U/L (ref 7–40)
ANION GAP SERPL CALCULATED.3IONS-SCNC: 8 MMOL/L (ref 5–15)
BILIRUB SERPL-MCNC: 0.3 MG/DL (ref 0.2–1)
BUN SERPL-MCNC: 5 MG/DL (ref 9–23)
BUN/CREAT SERPL: 7.7 (ref 10–20)
CALCIUM SERPL-MCNC: 8.5 MG/DL (ref 8.7–10.4)
CHLORIDE SERPL-SCNC: 106 MMOL/L (ref 98–107)
CO2 SERPL-SCNC: 21 MMOL/L (ref 20–31)
GLUCOSE SERPL-MCNC: 221 MG/DL (ref 74–106)
MAGNESIUM SERPL-MCNC: 1.6 MG/DL (ref 1.6–2.6)
POTASSIUM SERPL-SCNC: 3.4 MMOL/L (ref 3.5–5.1)
PROT SERPL-MCNC: 5.5 G/DL (ref 5.7–8.2)
SODIUM SERPL-SCNC: 135 MMOL/L (ref 136–145)

## 2025-01-29 RX ADMIN — MAGNESIUM SULFATE IN DEXTROSE ONE MLS/HR: 10 INJECTION, SOLUTION INTRAVENOUS at 14:44

## 2025-01-29 RX ADMIN — HYDROMORPHONE HYDROCHLORIDE ONE MG: 2 INJECTION, SOLUTION INTRAMUSCULAR; INTRAVENOUS; SUBCUTANEOUS at 07:35

## 2025-01-29 RX ADMIN — CEFTRIAXONE SODIUM SCH MLS/HR: 1 INJECTION, POWDER, FOR SOLUTION INTRAMUSCULAR; INTRAVENOUS at 09:53

## 2025-01-29 NOTE — DVHPN2
Progress Note


Date Seen:  Jan 29, 2025


Medical Necessity Reason


Pt with a Central, PICC or Fol:  No





Objective


vital signs





                                   Vital Sign








  Date Time  Temp Pulse Resp B/P (MAP) Pulse Ox O2 Delivery O2 Flow Rate FiO2


 


1/29/25 12:35 98.2 85 20 132/74 (93) 94   





 98.2       


 


1/29/25 07:30      Room Air* 0 21














                           Total Intake and Output   


 


 1/28/25 1/28/25 1/29/25





 15:00 23:00 07:00


 


Intake Total 50 ml 0 ml 


 


Output Total  1520 ml 650 ml


 


Balance 50 ml -1520 ml -650 ml








medications





                               Current Medications








 Medications  Dose


 Ordered  Sig/Jennifer


 Route  Start Time


 Stop Time Status Last Admin


Dose Admin


 


 Pantoprazole


 Sodium  40 mg  DAILY


 IV  1/24/25 10:00


    1/29/25 09:53


40 MG


 


 Metronidazole  100 ml @ 


 100 mls/hr  Q8HR


 IV  1/24/25 14:00


    1/29/25 05:47


100 MLS/HR


 


 Ondansetron HCl  4 mg  Q4HP  PRN


 IV  1/24/25 04:15


     





 


 Docusate Sodium  100 mg  BIDPRN  PRN


 PO  1/24/25 04:15


     





 


 Acetaminophen  650 mg  Q6HP  PRN


 PO  1/24/25 04:15


     





 


 Nitroglycerin  0.4 mg  Q5MINP  PRN


 SL  1/24/25 04:30


     





 


 Morphine Sulfate  2 mg  Q30M  PRN


 IV  1/24/25 04:30


     





 


 Phenol/Menthol  1 spr  Q2HP  PRN


 MT  1/25/25 13:30


     





 


 Morphine Sulfate  4 mg  Q3HP  PRN


 IV  1/26/25 11:15


    1/29/25 09:48


4 MG


 


 Potassium


 Chloride/Sodium


 Chloride  1,000 ml @ 


 100 mls/hr  Q10H


 IV  1/26/25 13:00


    1/29/25 05:46


100 MLS/HR


 


 Enoxaparin Sodium  40 mg  DAILY


 SC  1/27/25 10:00


    1/29/25 09:55


40 MG


 


 Amino Acids  0 ml @ 0


 mls/hr  PER  PHARMACY


 IV  1/27/25 12:15


     





 


 Diagnostic Test


  (Pha)  1 strip  Q6HR


 VI  1/27/25 18:00


    1/29/25 12:17


1 STRIP


 


 Insulin Human


 Regular  FOLLOW


 SLIDING


 SCALE  Q6HR


 SC  1/27/25 18:00


    1/29/25 12:31


4 UNITS


 


 Dextrose  50 ml  UD


 IV  1/27/25 12:45


     





 


 Amino Acids/


 Electrolytes/


 Dextrose  1,000 ml @ 


 41 mls/hr  DAILY@2200


 IV  1/27/25 22:00


    1/28/25 21:01


41 MLS/HR


 


 Ceftriaxone Sodium  50 ml @ 


 100 mls/hr  DAILY@09


 IV  1/29/25 09:00


    1/29/25 09:53


100 MLS/HR








laboratory and microbiology


                                Laboratory Tests


1/29/25 06:21








1/28/25 05:50

















Test


 1/29/25


06:21 Range/Units


 


 


Serum Glucose 221 H   mg/dL











Problem List/Assessment/Plan


Problem List/Assessment/Plan


1/29/25 covering for,: patient had a colostomy done at [facility]

in the past, came to this facility c/o abdominal pain and was found to have a 

necrotic colostomy with sub facsial perforation.  resected the previous 

colostomy and established a new one, which is viable and functioning (gas in the

appliance). wound clean and well approximated,  will dc NGT


Plan discussed with:  Patient





Dietary Evaluation Review


Comments:  


1. Recommend TPN per pharmacy to meet 75% of pt's needs for protein and  


kcal. 





2. If EN/GI accessible, Glucerna 1.2 @50ml/hr will provide 72 gPro  


1440kcal supporting pt's needs at 80% pro and 80% of Kcal. 





3. If EN/GI accessible, PO feeding meedically feasible, may offer a  


CCHO-60 diet for DM control.


Expected Outcomes/Goals:  


improved healing and improved nutrition status, gradual weight loss.











KANIKA ESCOBEDO MD                Jan 29, 2025 12:54

## 2025-01-29 NOTE — DVHPNRES
Progress Note


Date Seen:  2025


Resident Creating Document:  MARV LOERA RESIDENT


Medical Necessity Reason


Pt with a Central, PICC or Fol:  No


Medical Necessity Reason


POD #5





Subjective


Review of Systems


This is a 51 year old male with a past medical history of colon cancer s/p 

section in 2015 and colostomy bag, diabetes mellitus, hypertension. He presented

to [facility] ED with complaint of acute abdominal pain. Patient 

said that he has been having the pain for the past 2 weeks that is generalized 

and associated with loss of  appetite, but without nausea or vomiting.  Patient 

also mentioned that he skin around the colostomy is red and the colostomy bag 

has some amount of blood in it. Patient denied chest pain, headache, dizziness, 

diaphoresis, shortness of breath, nausea, vomiting, fever, chill. Vitals are 

temperature 99.1, pulse 111, RR:16 and Blood pressure 128/71. Laboratory data 

showed WBC 11.3, platelets 358, sodium 127, potassium 3.1, BUN 11, creatinine 

1.17, GFR 72, glucose 644, lipase 32. Abdomen/pelvis CT revealing solid nodules 

measuring 9.5 mm is seen in the left lower lobe, and enhancing lesion measuring 

10 mm seen in the right lobe of liver suggestive of hemangioma or materials; 

colostomy is noted in the left lower quadrant with dilated bowel loops seen in 

the colostomy measuring a proximally 6.3 cm, appears to be loaded with feces; 

mild fat stranding is noted in the adjacent subcutaneous fat with extraluminal 

air foci questionable for perforation. Patient started IV antibiotic regimen 

Flagyl, 





PN 2025:  Patient is seen and examined today at the bedside.  He is status 

post surgery.  He has NG tube placed draining abdominal contents. He is on pain 

medication on board.  Be 0.  Labs today showed WBC of 9.3, chloride 107 carbon 

dioxide 17 his, glucose 389.  Patient currently patient is currently on 

ceftriaxone and metronidazole and also morphine 4 mg Q 4 H p.r.n.





PN 2025: Patient seen and examined. He was lying in bed with NG tube. Still

NPO. He has no complaints. Labs value showed wbc: 8.7, K: 3.2, m.5.  gr 

dc and patient is working with PT.





PN 2025: Patient seen and examined. He was lying in bed with NG tube. Still

in pain. Incision site is clean. but old stoma site still drains some 

serosanguineous fluid. He is on morphine 4mg q3hrs prn. Labs shows Wbc  7.6, K: 

3.3, mg 1.7. He is on clinimix and PT is working with him. Will continue to 

monitor him closely. Give adequate pain medication and continue to try to get 

him out of bed.








PN 2025:  Patient seen and examine. He is mild-moderate pain.  NGT in 

place. Wound care nurse took swap from the old stoma as it was draining pus. 

Patient was seen by the surgery team and recommended TPN. I called the order 

into the pharmacy.  Patient will start tomorrow.





Objective


vital signs





                                   Vital Sign








  Date Time  Temp Pulse Resp B/P (MAP) Pulse Ox O2 Delivery O2 Flow Rate FiO2


 


25 16:53 98.4 87 20 142/77 (98) 94   





 98.4       


 


25 07:30      Room Air* 0 21














                           Total Intake and Output   


 


 25





 15:00 23:00 07:00


 


Intake Total 50 ml 0 ml 


 


Output Total  1520 ml 650 ml


 


Balance 50 ml -1520 ml -650 ml








medications





                               Current Medications








 Medications  Dose


 Ordered  Sig/Jennifer


 Route  Start Time


 Stop Time Status Last Admin


Dose Admin


 


 Pantoprazole


 Sodium  40 mg  DAILY


 IV  25 10:00


    25 09:53


40 MG


 


 Metronidazole  100 ml @ 


 100 mls/hr  Q8HR


 IV  25 14:00


    25 16:15


100 MLS/HR


 


 Ondansetron HCl  4 mg  Q4HP  PRN


 IV  25 04:15


     





 


 Docusate Sodium  100 mg  BIDPRN  PRN


 PO  25 04:15


     





 


 Acetaminophen  650 mg  Q6HP  PRN


 PO  25 04:15


     





 


 Nitroglycerin  0.4 mg  Q5MINP  PRN


 SL  25 04:30


     





 


 Morphine Sulfate  2 mg  Q30M  PRN


 IV  25 04:30


     





 


 Phenol/Menthol  1 spr  Q2HP  PRN


 MT  25 13:30


     





 


 Morphine Sulfate  4 mg  Q3HP  PRN


 IV  25 11:15


    25 14:46


4 MG


 


 Potassium


 Chloride/Sodium


 Chloride  1,000 ml @ 


 100 mls/hr  Q10H


 IV  25 13:00


    25 05:46


100 MLS/HR


 


 Enoxaparin Sodium  40 mg  DAILY


 SC  25 10:00


    25 09:55


40 MG


 


 Amino Acids  0 ml @ 0


 mls/hr  PER  PHARMACY


 IV  25 12:15


     





 


 Diagnostic Test


  (Pha)  1 strip  Q6HR


 VI  25 18:00


    25 12:17


1 STRIP


 


 Insulin Human


 Regular  FOLLOW


 SLIDING


 SCALE  Q6HR


 SC  25 18:00


    25 12:31


4 UNITS


 


 Dextrose  50 ml  UD


 IV  25 12:45


     





 


 Amino Acids/


 Electrolytes/


 Dextrose  1,000 ml @ 


 41 mls/hr  DAILY@2200


 IV  25 22:00


    25 21:01


41 MLS/HR


 


 Ceftriaxone Sodium  50 ml @ 


 100 mls/hr  DAILY@09


 IV  25 09:00


    25 09:53


100 MLS/HR








Examination


General Appearance:  Alert, Oriented X3, Cooperative, abdominal pain with mild 

distress, NG tube placed, very minimal gastric juice drainage today


HEENT:  Atraumatic, PERRLA, EOMI, Mucous membrane moist/pink


Respiratory:  Clear to auscultation, Normal air movement


Cardiovascular:  Regular rate, Normal S1, Normal S2, No murmurs, no chest wall 

tenderness


Abdominal: Status post surgery. staples noted, drains seen. old stoma site still

drains, pain is passing gas, gr dc; NG Tube dc


Extremities:  No clubbing, No cyanosis, No edema, Normal pulses, No 

tenderness/swelling


Skin:  No rashes, No breakdown, No significant lesion


Neuro:  Normal gait, Normal speech, Strength at 5/5 X4 ext, Normal tone, 

Sensation intact, Cranial nerves 3-12 NL, Reflexes 2+


Psych/Mental Status:  Mental status NL, Mood NL


laboratory and microbiology


                                Laboratory Tests


25 06:21








25 05:50

















Test


 25


06:21 Range/Units


 


 


Serum Glucose 221 H   mg/dL








                                  Microbiology








 Date/Time


Source Procedure


Growth Status





 


 25 17:20


Anus Gram Stain - Final


 Complete





 25 17:20 Anaerobic Culture - Final


Prevotella loescheii


Bacteroides theitaiotaomicron Complete


 


 25 17:20 Aerobic Culture - Final


Escherichia coli


Klebsiella pneumoniae


Enterococcus  faecalis Complete


 


 25 03:40


Blood Blood Culture - Final


NO GROWTH AFTER 5 DAYS OF INCUBATION. Complete











Problem List/Assessment/Plan


Problem List/Assessment/Plan


Assessment


Sepsis due to  colonic perforation/abdominal wall cellulitis; present on 

Admission


Colonic perforation within the abdominal wall of the colostomy site likely 

secondary to necrotic colon


--> s/p Exploratory laparotomy with resection of necrotic and perforated 

colostomy with colostomy revision


Abdominal wall cellulitis.


hypokalemia


Hyponatremia


Leukocytosis


History of colon cancer status post resection 


Possible metastasis to the lungs and liver, by CT report


Diabetes mellitus


Hypertension.


Mild anemia


obesity, BMI 32.3








plan 


Antibiotics: Metronidazole and ceftriaxone 


NPO, 


IV fluid


Replace electrolytes as necessary


Daily CBC and CMP


Surgical team following


GI following


Adequate pain medication; Morphine 4 mg q3hrs prn


TPN per pharmacy tomorrow


To follow up with Dr. Arredondo about the new findings in lung and liver





Code status: Full


Goal of care discussed for more than 25 minutes


Case and plan discussed and reviewed with Dr. soto


Plan discussed with:  Patient





My Orders


My Orders





                         Orders - MARV LOERA RESIDENT








Procedure Category Date Status





  Time 


 


Clinimix Per Pharmacy ALLY 25 In Process





  22:00 


 


Comprehensive LAB 25 Verified





Metabolic Panel  04:00 


 


Phosphorus LAB 25 Verified





  04:00 


 


Magnesium LAB 25 Verified





  04:00 











Dietary Evaluation Review


Comments:  


1. Recommend TPN per pharmacy to meet 75% of pt's needs for protein and  


kcal. 





2. If EN/GI accessible, Glucerna 1.2 @50ml/hr will provide 72 gPro  


1440kcal supporting pt's needs at 80% pro and 80% of Kcal. 





3. If EN/GI accessible, PO feeding meedically feasible, may offer a  


CCHO-60 diet for DM control.


Expected Outcomes/Goals:  


improved healing and improved nutrition status, gradual weight loss.





Date of Service:  2025


Billing Provider:  BERNARDO SOTO MD


Common Visit Codes:  [id number] INP/OBS CARE(HIGH)











MARV LOERA          2025 18:36


BERNARDO SOTO MD             2025 12:00

## 2025-01-30 VITALS
HEART RATE: 59 BPM | RESPIRATION RATE: 20 BRPM | OXYGEN SATURATION: 98 % | TEMPERATURE: 98.3 F | DIASTOLIC BLOOD PRESSURE: 70 MMHG | SYSTOLIC BLOOD PRESSURE: 121 MMHG

## 2025-01-30 VITALS
OXYGEN SATURATION: 96 % | DIASTOLIC BLOOD PRESSURE: 64 MMHG | HEART RATE: 63 BPM | RESPIRATION RATE: 20 BRPM | TEMPERATURE: 98.6 F | SYSTOLIC BLOOD PRESSURE: 108 MMHG

## 2025-01-30 VITALS
SYSTOLIC BLOOD PRESSURE: 128 MMHG | DIASTOLIC BLOOD PRESSURE: 69 MMHG | HEART RATE: 79 BPM | RESPIRATION RATE: 16 BRPM | TEMPERATURE: 97.6 F | OXYGEN SATURATION: 95 %

## 2025-01-30 VITALS
OXYGEN SATURATION: 94 % | DIASTOLIC BLOOD PRESSURE: 71 MMHG | SYSTOLIC BLOOD PRESSURE: 119 MMHG | HEART RATE: 81 BPM | TEMPERATURE: 98 F | RESPIRATION RATE: 19 BRPM

## 2025-01-30 VITALS
DIASTOLIC BLOOD PRESSURE: 65 MMHG | TEMPERATURE: 98.4 F | HEART RATE: 76 BPM | RESPIRATION RATE: 20 BRPM | OXYGEN SATURATION: 95 % | SYSTOLIC BLOOD PRESSURE: 136 MMHG

## 2025-01-30 VITALS
TEMPERATURE: 98 F | DIASTOLIC BLOOD PRESSURE: 65 MMHG | SYSTOLIC BLOOD PRESSURE: 115 MMHG | OXYGEN SATURATION: 100 % | HEART RATE: 97 BPM | RESPIRATION RATE: 18 BRPM

## 2025-01-30 VITALS — OXYGEN SATURATION: 94 % | RESPIRATION RATE: 18 BRPM | HEART RATE: 87 BPM

## 2025-01-30 VITALS — HEART RATE: 87 BPM

## 2025-01-30 LAB
ALBUMIN SERPL-MCNC: 3.4 G/DL (ref 3.2–4.8)
ALP SERPL-CCNC: 71 U/L (ref 46–116)
ALT SERPL-CCNC: 9 U/L (ref 7–40)
ANION GAP SERPL CALCULATED.3IONS-SCNC: 8 MMOL/L (ref 5–15)
BILIRUB SERPL-MCNC: 0.3 MG/DL (ref 0.2–1)
BUN SERPL-MCNC: < 5 MG/DL (ref 9–23)
BUN/CREAT SERPL: 8.3 (ref 10–20)
CALCIUM SERPL-MCNC: 8.7 MG/DL (ref 8.7–10.4)
CHLORIDE SERPL-SCNC: 106 MMOL/L (ref 98–107)
CO2 SERPL-SCNC: 20 MMOL/L (ref 20–31)
GLUCOSE SERPL-MCNC: 229 MG/DL (ref 74–106)
HCT VFR BLD AUTO: 34.5 % (ref 41–53)
HGB BLD-MCNC: 11.7 G/DL (ref 13.5–17.5)
MAGNESIUM SERPL-MCNC: 1.8 MG/DL (ref 1.6–2.6)
MCH RBC QN AUTO: 29.3 PG (ref 28–32)
MCV RBC AUTO: 86.1 FL (ref 80–100)
NRBC BLD QL AUTO: 0.1 %
POTASSIUM SERPL-SCNC: 3.5 MMOL/L (ref 3.5–5.1)
PROT SERPL-MCNC: 5.7 G/DL (ref 5.7–8.2)
SODIUM SERPL-SCNC: 134 MMOL/L (ref 136–145)

## 2025-01-30 RX ADMIN — HUMAN INSULIN SCH UNITS: 100 INJECTION, SOLUTION SUBCUTANEOUS at 17:26

## 2025-01-30 RX ADMIN — BISACODYL PRN MG: 5 TABLET, DELAYED RELEASE ORAL at 17:10

## 2025-01-30 RX ADMIN — Medication SCH STRIP: at 21:20

## 2025-01-30 RX ADMIN — HUMAN INSULIN SCH UNITS: 100 INJECTION, SOLUTION SUBCUTANEOUS at 21:21

## 2025-01-30 NOTE — DVHPNRES
Progress Note


Date Seen:  2025


Resident Creating Document:  MARV LOERA RESIDENT


Medical Necessity Reason


Pt with a Central, PICC or Fol:  No


Medical Necessity Reason


POD#6


s/p RESPIRATORY LAPAROTOMY





Subjective


Review of Systems


This is a 51 year old male with a past medical history of colon cancer s/p 

section in 2015 and colostomy bag, diabetes mellitus, hypertension. He presented

to [facility] ED with complaint of acute abdominal pain. Patient 

said that he has been having the pain for the past 2 weeks that is generalized 

and associated with loss of  appetite, but without nausea or vomiting.  Patient 

also mentioned that he skin around the colostomy is red and the colostomy bag 

has some amount of blood in it. Patient denied chest pain, headache, dizziness, 

diaphoresis, shortness of breath, nausea, vomiting, fever, chill. Vitals are 

temperature 99.1, pulse 111, RR:16 and Blood pressure 128/71. Laboratory data 

showed WBC 11.3, platelets 358, sodium 127, potassium 3.1, BUN 11, creatinine 

1.17, GFR 72, glucose 644, lipase 32. Abdomen/pelvis CT revealing solid nodules 

measuring 9.5 mm is seen in the left lower lobe, and enhancing lesion measuring 

10 mm seen in the right lobe of liver suggestive of hemangioma or materials; 

colostomy is noted in the left lower quadrant with dilated bowel loops seen in 

the colostomy measuring a proximally 6.3 cm, appears to be loaded with feces; 

mild fat stranding is noted in the adjacent subcutaneous fat with extraluminal 

air foci questionable for perforation. Patient started IV antibiotic regimen 

Flagyl, 





PN 2025:  Patient is seen and examined today at the bedside.  He is status 

post surgery.  He has NG tube placed draining abdominal contents. He is on pain 

medication on board.  Be 0.  Labs today showed WBC of 9.3, chloride 107 carbon 

dioxide 17 his, glucose 389.  Patient currently patient is currently on 

ceftriaxone and metronidazole and also morphine 4 mg Q 4 H p.r.n.





PN 2025: Patient seen and examined. He was lying in bed with NG tube. Still

NPO. He has no complaints. Labs value showed wbc: 8.7, K: 3.2, m.5.  gr 

dc and patient is working with PT.





PN 2025: Patient seen and examined. He was lying in bed with NG tube. Still

in pain. Incision site is clean. but old stoma site still drains some 

serosanguineous fluid. He is on morphine 4mg q3hrs prn. Labs shows Wbc  7.6, K: 

3.3, mg 1.7. He is on clinimix and PT is working with him. Will continue to 

monitor him closely. Give adequate pain medication and continue to try to get 

him out of bed.








PN 2025:  Patient seen and examine. He is mild-moderate pain.  NGT in 

place. Wound care nurse took swap from the old stoma as it was draining pus. 

Patient was seen by the surgery team and recommended TPN. I called the order 

into the pharmacy.  Patient will start tomorrow.





PN 2025:  Patient is seen and examined today.  At the time of my visit 

patient was walking with PT very much improved.  A lot better so has a mod 

painful which we given pain medication. Vitals are completely within normal 

range temperature of 98.4 heart rate of 76 respiratory of 20 and blood pressure 

136.  Lab is looking great with WBC of 6.0 hemoglobin of 11.7; chemistry of 134 

and blood sugar of to 227. All other parameters are within normal limits. 

Patient is also followed by the surgeon who has given the go ahead for the 

patient to start clear liquid diet. We will continue to monitor the patient 

closely.  Continue current medication and pain management





Objective


vital signs





                                   Vital Sign








  Date Time  Temp Pulse Resp B/P (MAP) Pulse Ox O2 Delivery O2 Flow Rate FiO2


 


25 21:00 98.4 76 20 136/65 (88) 95   





 98.4       


 


25 08:00      Room Air* 0 21














                           Total Intake and Output   


 


 25





 15:00 23:00 07:00


 


Intake Total 50 ml 1841 ml 1328 ml


 


Output Total  1700 ml 1120 ml


 


Balance 50 ml 141 ml 208 ml








medications





                               Current Medications








 Medications  Dose


 Ordered  Sig/Jennifer


 Route  Start Time


 Stop Time Status Last Admin


Dose Admin


 


 Pantoprazole


 Sodium  40 mg  DAILY


 IV  25 10:00


    25 07:54


40 MG


 


 Metronidazole  100 ml @ 


 100 mls/hr  Q8HR


 IV  25 14:00


    25 21:23


100 MLS/HR


 


 Ondansetron HCl  4 mg  Q4HP  PRN


 IV  25 04:15


     





 


 Docusate Sodium  100 mg  BIDPRN  PRN


 PO  25 04:15


     





 


 Acetaminophen  650 mg  Q6HP  PRN


 PO  25 04:15


     





 


 Nitroglycerin  0.4 mg  Q5MINP  PRN


 SL  25 04:30


     





 


 Morphine Sulfate  2 mg  Q30M  PRN


 IV  25 04:30


     





 


 Phenol/Menthol  1 spr  Q2HP  PRN


 MT  25 13:30


     





 


 Morphine Sulfate  4 mg  Q3HP  PRN


 IV  25 11:15


    25 17:43


4 MG


 


 Potassium


 Chloride/Sodium


 Chloride  1,000 ml @ 


 100 mls/hr  Q10H


 IV  25 13:00


    25 17:11


100 MLS/HR


 


 Enoxaparin Sodium  40 mg  DAILY


 SC  25 10:00


    25 07:55


40 MG


 


 Amino Acids/


 Electrolytes/


 Dextrose  1,000 ml @ 


 41 mls/hr  DAILY@2200


 IV  25 22:00


 25 21:59  25 22:08


41 MLS/HR


 


 Ceftriaxone Sodium  50 ml @ 


 100 mls/hr  DAILY@09


 IV  25 09:00


    25 07:54


100 MLS/HR


 


 Bisacodyl  5 mg  DAILYP  PRN


 PO  25 09:30


    25 17:10


5 MG


 


 Fat Emulsion


 Intravenous 50 ml/


 Sodium Acetate 20


 meq/Potassium


 Phosphate 22 meq/


 Magnesium Sulfate


 8 meq/


 Multivitamins 10


 ml/Chromium/


 Copper/Manganese/


 Zinc 1 ml/Insulin


 Human Regular 8


 units/Amino Acids/


 Dextrose/Purified


 Water  1,278.08 ml


  @ 53 mls/hr  Q24H7M


 IV  25 22:00


 25 21:59 Cancel  





 


 Diagnostic Test


  (Pha)  1 strip  ACHS


 VI  25 22:00


    25 21:20


1 STRIP


 


 Insulin Human


 Regular    HS


 SC  25 22:00


    25 21:21


4 UNITS


 


 Insulin Human


 Regular    AC


 SC  25 17:15


    25 17:26


9 UNITS


 


 Dextrose  50 ml  UD  PRN


 IV  25 17:15


     











Examination


General Appearance:  Alert, Oriented X3, Cooperative, abdominal pain with mild 

distress, NGT removed. patient ambulatory


HEENT:  Atraumatic, PERRLA, EOMI, Mucous membrane moist/pink


Respiratory:  Clear to auscultation, Normal air movement


Cardiovascular:  Regular rate, Normal S1, Normal S2, No murmurs, no chest wall 

tenderness


Abdominal: Status post surgery. staples noted, drains seen. old stoma site still

drains, pain is passing gas, gr dc; NG Tube dc


Extremities:  No clubbing, No cyanosis, No edema, Normal pulses, No 

tenderness/swelling


Skin:  No rashes, No breakdown, No significant lesion


Neuro:  Normal gait, Normal speech, Strength at 5/5 X4 ext, Normal tone, 

Sensation intact, Cranial nerves 3-12 NL, Reflexes 2+


Psych/Mental Status:  Mental status NL, Mood NL


laboratory and microbiology


                                Laboratory Tests


25 05:48

















Test


 25


05:48 Range/Units


 


 


Serum Glucose 229 H   mg/dL








                                  Microbiology








 Date/Time


Source Procedure


Growth Status





 


 25 11:45


Abdomen Gram Stain - Final


 Resulted


 


 25 11:45


Abdomen Wound Culture - Preliminary


 Resulted





 25 17:20


Anus Gram Stain - Final


 Complete





 25 17:20 Anaerobic Culture - Final


Prevotella loescheii


Bacteroides theitaiotaomicron Complete


 


 25 17:20 Aerobic Culture - Final


Escherichia coli


Klebsiella pneumoniae


Enterococcus  faecalis Complete


 


 25 03:40


Blood Blood Culture - Final


NO GROWTH AFTER 5 DAYS OF INCUBATION. Complete











Problem List/Assessment/Plan


Problem List/Assessment/Plan


Assessment


Sepsis due to  colonic perforation/abdominal wall cellulitis; present on 

Admission


Colonic perforation within the abdominal wall of the colostomy site likely 

secondary to necrotic colon


--> s/p Exploratory laparotomy with resection of necrotic and perforated 

colostomy with colostomy revision


Abdominal wall cellulitis--> Improved


hypokalemia


Hyponatremia


Leukocytosis


History of colon cancer status post resection 


Possible metastasis to the lungs and liver, by CT report


Diabetes mellitus


hyperglycemia


Hypertension.


Mild anemia


obesity, BMI 32.3








plan 


Antibiotics: Metronidazole and ceftriaxone 


Clear liquid diet


Replace electrolytes as necessary


Daily BMP


Surgical team following


Adequate pain medication; Morphine 4 mg q3hrs prn


Insulin moderate sliding scale


pantoprazole


To follow up with Dr. Arredondo about the new findings in lung and liver





Code status: Full


Goal of care discussed for more than 25 minutes


Case and plan discussed and reviewed with Dr. Soto


Plan discussed with:  Patient





Dietary Evaluation Review


Comments:  


1. Recommend TPN per pharmacy to meet 75% of pt's needs for protein and  


kcal. 





2. If EN/GI accessible, Glucerna 1.2 @50ml/hr will provide 72 gPro  


1440kcal supporting pt's needs at 80% pro and 80% of Kcal. 





3. If EN/GI accessible, PO feeding meedically feasible, may offer a  


CCHO-60 diet for DM control.


Expected Outcomes/Goals:  


improved healing and improved nutrition status, gradual weight loss.





Date of Service:  2025


Billing Provider:  BERNARDO SOTO MD


Common Visit Codes:  27443-[id number] INP/OBS CARE(HIGH)











MARV LOERA RESIDENT          2025 22:06


BERNARDO SOTO MD              Feb 3, 2025 10:50

## 2025-01-30 NOTE — DVHPN2
Progress Note


Date Seen:  Jan 30, 2025


Medical Necessity Reason


Pt with a Central, PICC or Fol:  No





Objective


vital signs





                                   Vital Sign








  Date Time  Temp Pulse Resp B/P (MAP) Pulse Ox O2 Delivery O2 Flow Rate FiO2


 


1/30/25 12:24 98.6 63 20 108/64 (79) 96   





 98.6       


 


1/30/25 08:00      Room Air* 0 21














                           Total Intake and Output   


 


 1/29/25 1/29/25 1/30/25





 15:00 23:00 07:00


 


Intake Total 50 ml 1841 ml 1328 ml


 


Output Total  1700 ml 1120 ml


 


Balance 50 ml 141 ml 208 ml








medications





                               Current Medications








 Medications  Dose


 Ordered  Sig/Jennifer


 Route  Start Time


 Stop Time Status Last Admin


Dose Admin


 


 Pantoprazole


 Sodium  40 mg  DAILY


 IV  1/24/25 10:00


    1/30/25 07:54


40 MG


 


 Metronidazole  100 ml @ 


 100 mls/hr  Q8HR


 IV  1/24/25 14:00


    1/30/25 05:48


100 MLS/HR


 


 Ondansetron HCl  4 mg  Q4HP  PRN


 IV  1/24/25 04:15


     





 


 Docusate Sodium  100 mg  BIDPRN  PRN


 PO  1/24/25 04:15


     





 


 Acetaminophen  650 mg  Q6HP  PRN


 PO  1/24/25 04:15


     





 


 Nitroglycerin  0.4 mg  Q5MINP  PRN


 SL  1/24/25 04:30


     





 


 Morphine Sulfate  2 mg  Q30M  PRN


 IV  1/24/25 04:30


     





 


 Phenol/Menthol  1 spr  Q2HP  PRN


 MT  1/25/25 13:30


     





 


 Morphine Sulfate  4 mg  Q3HP  PRN


 IV  1/26/25 11:15


    1/30/25 05:49


4 MG


 


 Potassium


 Chloride/Sodium


 Chloride  1,000 ml @ 


 100 mls/hr  Q10H


 IV  1/26/25 13:00


    1/29/25 18:34


100 MLS/HR


 


 Enoxaparin Sodium  40 mg  DAILY


 SC  1/27/25 10:00


    1/30/25 07:55


40 MG


 


 Amino Acids  0 ml @ 0


 mls/hr  PER  PHARMACY


 IV  1/27/25 12:15


 1/30/25 13:00   





 


 Diagnostic Test


  (Pha)  1 strip  Q6HR


 VI  1/27/25 18:00


    1/30/25 12:19


1 STRIP


 


 Insulin Human


 Regular  FOLLOW


 SLIDING


 SCALE  Q6HR


 SC  1/27/25 18:00


    1/30/25 12:03


2 UNITS


 


 Dextrose  50 ml  UD


 IV  1/27/25 12:45


     





 


 Amino Acids/


 Electrolytes/


 Dextrose  1,000 ml @ 


 41 mls/hr  DAILY@2200


 IV  1/27/25 22:00


 1/30/25 21:59  1/29/25 22:08


41 MLS/HR


 


 Ceftriaxone Sodium  50 ml @ 


 100 mls/hr  DAILY@09


 IV  1/29/25 09:00


    1/30/25 07:54


100 MLS/HR


 


 Amino Acids  0 ml @ 0


 mls/hr  PER  PHARMACY


 IV  1/29/25 17:45


     





 


 Bisacodyl  5 mg  DAILYP  PRN


 PO  1/30/25 09:30


     





 


 Fat Emulsion


 Intravenous 50 ml/


 Sodium Acetate 20


 meq/Potassium


 Phosphate 22 meq/


 Magnesium Sulfate


 8 meq/


 Multivitamins 10


 ml/Chromium/


 Copper/Manganese/


 Zinc 1 ml/Insulin


 Human Regular 6


 units/Amino Acids/


 Dextrose/Purified


 Water  1,028.06 ml


  @ 43 mls/hr  [id number]


 IV  1/30/25 22:00


 1/31/25 21:59   











laboratory and microbiology


                                Laboratory Tests


1/30/25 05:48

















Test


 1/30/25


05:48 Range/Units


 


 


Serum Glucose 229 H   mg/dL











Problem List/Assessment/Plan


Problem List/Assessment/Plan


1/29/25 covering for,: patient had a colostomy done at [facility]

in the past, came to this facility c/o abdominal pain and was found to have a 

necrotic colostomy with sub facsial perforation.  resected the previous 

colostomy and established a new one, which is viable and functioning (gas in the

appliance). wound clean and well approximated,  will dc NGT





1/30/25 i REMOVED THE PATIENT'S  NGT AS THE ORDER TO REMOVE IT FROM YESTERDAY 

WAS NOT FOLLOWED, ABDOMEN NONDISTENDED, NONTENDER, DRAINAGE SEROSANGUINEOUS, 

STOMA VIABLE, WILL ALLOW CLEAR LIQUIDS PO. WOUND CLEAN AND WELL APPROXIMATED


Plan discussed with:  Patient





Dietary Evaluation Review


Comments:  


1. Recommend TPN per pharmacy to meet 75% of pt's needs for protein and  


kcal. 





2. If EN/GI accessible, Glucerna 1.2 @50ml/hr will provide 72 gPro  


1440kcal supporting pt's needs at 80% pro and 80% of Kcal. 





3. If EN/GI accessible, PO feeding meedically feasible, may offer a  


CCHO-60 diet for DM control.


Expected Outcomes/Goals:  


improved healing and improved nutrition status, gradual weight loss.











KANIKA ESCOBEDO MD                Jan 30, 2025 12:42

## 2025-01-31 VITALS
DIASTOLIC BLOOD PRESSURE: 66 MMHG | OXYGEN SATURATION: 96 % | SYSTOLIC BLOOD PRESSURE: 117 MMHG | RESPIRATION RATE: 20 BRPM | TEMPERATURE: 98.3 F | HEART RATE: 95 BPM

## 2025-01-31 VITALS
OXYGEN SATURATION: 94 % | SYSTOLIC BLOOD PRESSURE: 111 MMHG | DIASTOLIC BLOOD PRESSURE: 65 MMHG | HEART RATE: 81 BPM | RESPIRATION RATE: 16 BRPM | TEMPERATURE: 97.9 F

## 2025-01-31 VITALS — RESPIRATION RATE: 18 BRPM | OXYGEN SATURATION: 94 % | HEART RATE: 88 BPM

## 2025-01-31 VITALS
TEMPERATURE: 98.3 F | DIASTOLIC BLOOD PRESSURE: 66 MMHG | OXYGEN SATURATION: 95 % | RESPIRATION RATE: 20 BRPM | SYSTOLIC BLOOD PRESSURE: 119 MMHG | HEART RATE: 97 BPM

## 2025-01-31 VITALS
SYSTOLIC BLOOD PRESSURE: 142 MMHG | DIASTOLIC BLOOD PRESSURE: 79 MMHG | RESPIRATION RATE: 17 BRPM | TEMPERATURE: 97.7 F | HEART RATE: 85 BPM | OXYGEN SATURATION: 99 %

## 2025-01-31 VITALS
OXYGEN SATURATION: 95 % | DIASTOLIC BLOOD PRESSURE: 69 MMHG | SYSTOLIC BLOOD PRESSURE: 122 MMHG | TEMPERATURE: 98.3 F | HEART RATE: 70 BPM | RESPIRATION RATE: 20 BRPM

## 2025-01-31 VITALS
SYSTOLIC BLOOD PRESSURE: 137 MMHG | OXYGEN SATURATION: 96 % | TEMPERATURE: 98.2 F | RESPIRATION RATE: 16 BRPM | DIASTOLIC BLOOD PRESSURE: 71 MMHG | HEART RATE: 77 BPM

## 2025-01-31 VITALS — HEART RATE: 93 BPM

## 2025-01-31 VITALS
TEMPERATURE: 98.3 F | HEART RATE: 82 BPM | DIASTOLIC BLOOD PRESSURE: 69 MMHG | OXYGEN SATURATION: 95 % | RESPIRATION RATE: 20 BRPM | SYSTOLIC BLOOD PRESSURE: 126 MMHG

## 2025-01-31 LAB
ANION GAP SERPL CALCULATED.3IONS-SCNC: 8 MMOL/L (ref 5–15)
BUN SERPL-MCNC: < 5 MG/DL (ref 9–23)
BUN/CREAT SERPL: 13.2 (ref 10–20)
CALCIUM SERPL-MCNC: 7.6 MG/DL (ref 8.7–10.4)
CHLORIDE SERPL-SCNC: 113 MMOL/L (ref 98–107)
CO2 SERPL-SCNC: 18 MMOL/L (ref 20–31)
GLUCOSE SERPL-MCNC: 165 MG/DL (ref 74–106)
POTASSIUM SERPL-SCNC: 2.8 MMOL/L (ref 3.5–5.1)
SODIUM SERPL-SCNC: 139 MMOL/L (ref 136–145)

## 2025-01-31 RX ADMIN — MEROPENEM SCH MLS/HR: 1 INJECTION, POWDER, FOR SOLUTION INTRAVENOUS at 14:32

## 2025-01-31 RX ADMIN — POTASSIUM BICARBONATE ONE MEQ: 978 TABLET, EFFERVESCENT ORAL at 13:40

## 2025-01-31 NOTE — DVHPN2
Subjective


Patient seems him at bedside.  No complaint today.


Reviewed:  Care Plan, H&P, Labs, Medications, Previous Orders, Radiology, Other


Changes from previous H/P or p:  No Changes





Objective


Vitals





Vital Signs








  Date Time  Temp Pulse Resp B/P (MAP) Pulse Ox O2 Delivery O2 Flow Rate FiO2


 


1/31/25 09:00 98.2 77 16 137/71 (93) 96   





 98.2       


 


1/31/25 08:00      Room Air* 0 21








Intake/Output











                               Intake and Output 


 


 1/31/25





 07:00


 


Intake Total 4473 ml


 


Output Total 2500 ml


 


Balance 1973 ml


 


 


 


Intake Oral 1870 ml


 


IV Total 2603 ml


 


Output Urine Total 2500 ml








General Appearance:  Alert, Oriented X3, Cooperative, mild distress


HEENT:  Atraumatic


Lungs:  Clear to auscultation, Normal air movement


Cardiovascular:  Regular rate, Normal S1, Normal S2


Abdomen:  Other


Genitourinary:  Other


Neuro:  Normal speech, Cranial nerves 3-12 NL


Psych/Mental Status:  Mental status NL, Mood NL


Medications





                               Current Medications








 Medications  Dose


 Ordered  Sig/Jennifer


 Route  Start Time


 Stop Time Status Last Admin


Dose Admin


 


 Pantoprazole


 Sodium  40 mg  DAILY


 IV  1/24/25 10:00


    1/31/25 07:49


40 MG


 


 Metronidazole  100 ml @ 


 100 mls/hr  Q8HR


 IV  1/24/25 14:00


    1/31/25 06:01


100 MLS/HR


 


 Ondansetron HCl  4 mg  Q4HP  PRN


 IV  1/24/25 04:15


     





 


 Docusate Sodium  100 mg  BIDPRN  PRN


 PO  1/24/25 04:15


     





 


 Acetaminophen  650 mg  Q6HP  PRN


 PO  1/24/25 04:15


     





 


 Nitroglycerin  0.4 mg  Q5MINP  PRN


 SL  1/24/25 04:30


     





 


 Morphine Sulfate  2 mg  Q30M  PRN


 IV  1/24/25 04:30


     





 


 Phenol/Menthol  1 spr  Q2HP  PRN


 MT  1/25/25 13:30


     





 


 Morphine Sulfate  4 mg  Q3HP  PRN


 IV  1/26/25 11:15


    1/31/25 07:59


4 MG


 


 Potassium


 Chloride/Sodium


 Chloride  1,000 ml @ 


 100 mls/hr  Q10H


 IV  1/26/25 13:00


    1/31/25 04:00


100 MLS/HR


 


 Enoxaparin Sodium  40 mg  DAILY


 SC  1/27/25 10:00


    1/31/25 07:50


40 MG


 


 Ceftriaxone Sodium  50 ml @ 


 100 mls/hr  DAILY@09


 IV  1/29/25 09:00


    1/31/25 07:50


100 MLS/HR


 


 Bisacodyl  5 mg  DAILYP  PRN


 PO  1/30/25 09:30


    1/31/25 07:50


5 MG


 


 Fat Emulsion


 Intravenous 50 ml/


 Sodium Acetate 20


 meq/Potassium


 Phosphate 22 meq/


 Magnesium Sulfate


 8 meq/


 Multivitamins 10


 ml/Chromium/


 Copper/Manganese/


 Zinc 1 ml/Insulin


 Human Regular 8


 units/Amino Acids/


 Dextrose/Purified


 Water  1,278.08 ml


  @ 53 mls/hr  Q24H7M


 IV  1/30/25 22:00


 1/31/25 21:59 Cancel  





 


 Diagnostic Test


  (Pha)  1 strip  ACHS


 VI  1/30/25 22:00


    1/31/25 10:36


1 STRIP


 


 Insulin Human


 Regular    HS


 SC  1/30/25 22:00


    1/30/25 21:21


4 UNITS


 


 Insulin Human


 Regular    AC


 SC  1/30/25 17:15


    1/31/25 10:36


6 UNITS


 


 Dextrose  50 ml  UD  PRN


 IV  1/30/25 17:15


     














Laboratory Results


Laboratory Tests


1/30/25 05:48








1/31/25 05:01











Chemistry








Test


 1/31/25


05:01


 


Calcium Level


 7.6 mg/dL


(8.7-10.4)  L








Microbiology





                                  Microbiology








 Date/Time


Source Procedure


Growth Status





 


 1/29/25 11:45


Abdomen Gram Stain - Final


 Resulted


 


 1/29/25 11:45


Abdomen Wound Culture - Preliminary


 Resulted





 1/24/25 17:20


Anus Gram Stain - Final


 Complete





 1/24/25 17:20 Anaerobic Culture - Final


Prevotella loescheii


Bacteroides theitaiotaomicron Complete


 


 1/24/25 17:20 Aerobic Culture - Final


Escherichia coli


Klebsiella pneumoniae


Enterococcus  faecalis Complete


 


 1/24/25 03:40


Blood Blood Culture - Final


NO GROWTH AFTER 5 DAYS OF INCUBATION. Complete








Labs and/or images reviewed:  Labs reviewed by me





Assessment/Plan


Assessment/Plan


Sepsis due to  colonic perforation/abdominal wall cellulitis; present on 

Admission


Colonic perforation within the abdominal wall of the colostomy site likely 

secondary to necrotic colon


--> s/p Exploratory laparotomy with resection of necrotic and perforated 

colostomy with colostomy revision


Abdominal wall cellulitis--> Improved


hypokalemia


Hyponatremia


Leukocytosis


History of colon cancer status post resection 


Possible metastasis to the lungs and liver, by CT report


Diabetes mellitus


hyperglycemia


Hypertension.


Mild anemia


obesity, BMI 32.3








plan 


Antibiotics: Metronidazole and ceftriaxone 


Clear liquid diet


Replace electrolytes as necessary


Daily BMP


Surgical team following


Adequate pain medication; Morphine 4 mg q3hrs prn


Insulin moderate sliding scale


pantoprazole


To follow up with Dr. Arredondo about the new findings in lung and liver


Waiting for surgeon to cleared the patient for discharge.








This medical document was created using an electronic medical record system with

[employer] direct computerized dictation system.  Although this document has 

been carefully reviewed, there may still be some phonetic and typographical 

errors.  These areas are purely typographical due to imperfections of the 

software programs, and do not reflect any compromise in the patient's medical 

care.


Plan discussed with:  Patient


My Orders





                           Orders - BERNARDO SOTO MD








Procedure Category Date Status





  Time 


 


Glucose Blood PHA 1/30/25 In Process





 (Accu-Chek Comfort  22:00 


 


Insulin R (Human) PHA 1/30/25 In Process





 (Insulin R)  22:00 


 


Insulin R (Human) PHA 1/30/25 In Process





 (Insulin R)  17:15 


 


Dextrose 50% Syringe PHA 1/30/25 In Process





  17:15 











Date of Service:  Jan 31, 2025


Billing Provider:  BERNARDO SOTO MD


Common Visit Codes:  [id number] INP/OBS CARE(HIGH)











BERNARDO SOTO MD             Jan 31, 2025 12:00

## 2025-01-31 NOTE — DVHPN2
Progress Note


Date Seen:  Jan 31, 2025


Medical Necessity Reason


Pt with a Central, PICC or Fol:  No





Objective


vital signs





                                   Vital Sign








  Date Time  Temp Pulse Resp B/P (MAP) Pulse Ox O2 Delivery O2 Flow Rate FiO2


 


1/31/25 09:00 98.2 77 16 137/71 (93) 96   





 98.2       


 


1/31/25 08:00      Room Air* 0 21














                           Total Intake and Output   


 


 1/30/25 1/30/25 1/31/25





 15:00 23:00 07:00


 


Intake Total 150 ml 3473 ml 850 ml


 


Output Total  1600 ml 900 ml


 


Balance 150 ml 1873 ml -50 ml








medications





                               Current Medications








 Medications  Dose


 Ordered  Sig/Jennifer


 Route  Start Time


 Stop Time Status Last Admin


Dose Admin


 


 Pantoprazole


 Sodium  40 mg  DAILY


 IV  1/24/25 10:00


    1/31/25 07:49


40 MG


 


 Metronidazole  100 ml @ 


 100 mls/hr  Q8HR


 IV  1/24/25 14:00


    1/31/25 06:01


100 MLS/HR


 


 Ondansetron HCl  4 mg  Q4HP  PRN


 IV  1/24/25 04:15


     





 


 Docusate Sodium  100 mg  BIDPRN  PRN


 PO  1/24/25 04:15


     





 


 Acetaminophen  650 mg  Q6HP  PRN


 PO  1/24/25 04:15


     





 


 Nitroglycerin  0.4 mg  Q5MINP  PRN


 SL  1/24/25 04:30


     





 


 Morphine Sulfate  2 mg  Q30M  PRN


 IV  1/24/25 04:30


     





 


 Phenol/Menthol  1 spr  Q2HP  PRN


 MT  1/25/25 13:30


     





 


 Morphine Sulfate  4 mg  Q3HP  PRN


 IV  1/26/25 11:15


    1/31/25 07:59


4 MG


 


 Potassium


 Chloride/Sodium


 Chloride  1,000 ml @ 


 100 mls/hr  Q10H


 IV  1/26/25 13:00


    1/31/25 04:00


100 MLS/HR


 


 Enoxaparin Sodium  40 mg  DAILY


 SC  1/27/25 10:00


    1/31/25 07:50


40 MG


 


 Ceftriaxone Sodium  50 ml @ 


 100 mls/hr  DAILY@09


 IV  1/29/25 09:00


    1/31/25 07:50


100 MLS/HR


 


 Bisacodyl  5 mg  DAILYP  PRN


 PO  1/30/25 09:30


    1/31/25 07:50


5 MG


 


 Fat Emulsion


 Intravenous 50 ml/


 Sodium Acetate 20


 meq/Potassium


 Phosphate 22 meq/


 Magnesium Sulfate


 8 meq/


 Multivitamins 10


 ml/Chromium/


 Copper/Manganese/


 Zinc 1 ml/Insulin


 Human Regular 8


 units/Amino Acids/


 Dextrose/Purified


 Water  1,278.08 ml


  @ 53 mls/hr  Q24H7M


 IV  1/30/25 22:00


 1/31/25 21:59 Cancel  





 


 Diagnostic Test


  (Pha)  1 strip  ACHS


 VI  1/30/25 22:00


    1/31/25 10:36


1 STRIP


 


 Insulin Human


 Regular    HS


 SC  1/30/25 22:00


    1/30/25 21:21


4 UNITS


 


 Insulin Human


 Regular    AC


 SC  1/30/25 17:15


    1/31/25 10:36


6 UNITS


 


 Dextrose  50 ml  UD  PRN


 IV  1/30/25 17:15


     











laboratory and microbiology


                                Laboratory Tests


1/31/25 05:01








1/30/25 05:48

















Test


 1/31/25


05:01 Range/Units


 


 


Serum Glucose 165 H   mg/dL











Problem List/Assessment/Plan


Problem List/Assessment/Plan


1/29/25 covering for,: patient had a colostomy done at [facility]

in the past, came to this facility c/o abdominal pain and was found to have a 

necrotic colostomy with sub facsial perforation.  resected the previous 

colostomy and established a new one, which is viable and functioning (gas in the

appliance). wound clean and well approximated,  will dc NGT





1/30/25 i REMOVED THE PATIENT'S  NGT AS THE ORDER TO REMOVE IT FROM YESTERDAY 

WAS NOT FOLLOWED, ABDOMEN NON DISTENDED, NON TENDER, DRAINAGE SEROSANGUINEOUS, 

STOMA VIABLE, WILL ALLOW CLEAR LIQUIDS PO. WOUND CLEAN AND WELL APPROXIMATED





1/31/25 FEELS WELL, TOLERATING PO INTAKE, NO NAUSEA OR VOMITING, WOUND CLEAN AND

WELL APPROXIMATED, STOMA FUNCTIONING


Plan discussed with:  Patient





Dietary Evaluation Review


Comments:  


1. Recommend TPN per pharmacy to meet 75% of pt's needs for protein and  


kcal. 





2. If EN/GI accessible, Glucerna 1.2 @50ml/hr will provide 72 gPro  


1440kcal supporting pt's needs at 80% pro and 80% of Kcal. 





3. If EN/GI accessible, PO feeding meedically feasible, may offer a  


CCHO-60 diet for DM control.


Expected Outcomes/Goals:  


improved healing and improved nutrition status, gradual weight loss.











KANIKA ESCOBEDO MD                Jan 31, 2025 12:04

## 2025-02-01 VITALS
DIASTOLIC BLOOD PRESSURE: 62 MMHG | HEART RATE: 86 BPM | OXYGEN SATURATION: 95 % | SYSTOLIC BLOOD PRESSURE: 136 MMHG | RESPIRATION RATE: 18 BRPM | TEMPERATURE: 98.1 F

## 2025-02-01 VITALS
RESPIRATION RATE: 18 BRPM | DIASTOLIC BLOOD PRESSURE: 97 MMHG | HEART RATE: 81 BPM | OXYGEN SATURATION: 95 % | TEMPERATURE: 98.1 F | SYSTOLIC BLOOD PRESSURE: 129 MMHG

## 2025-02-01 VITALS — HEART RATE: 80 BPM | RESPIRATION RATE: 18 BRPM

## 2025-02-01 VITALS
DIASTOLIC BLOOD PRESSURE: 74 MMHG | OXYGEN SATURATION: 94 % | SYSTOLIC BLOOD PRESSURE: 127 MMHG | HEART RATE: 86 BPM | RESPIRATION RATE: 18 BRPM | TEMPERATURE: 98.3 F

## 2025-02-01 VITALS
TEMPERATURE: 98.3 F | HEART RATE: 84 BPM | RESPIRATION RATE: 16 BRPM | OXYGEN SATURATION: 93 % | SYSTOLIC BLOOD PRESSURE: 131 MMHG | DIASTOLIC BLOOD PRESSURE: 67 MMHG

## 2025-02-01 VITALS
DIASTOLIC BLOOD PRESSURE: 63 MMHG | SYSTOLIC BLOOD PRESSURE: 109 MMHG | RESPIRATION RATE: 15 BRPM | TEMPERATURE: 98.2 F | OXYGEN SATURATION: 96 % | HEART RATE: 84 BPM

## 2025-02-01 VITALS
DIASTOLIC BLOOD PRESSURE: 65 MMHG | RESPIRATION RATE: 16 BRPM | SYSTOLIC BLOOD PRESSURE: 131 MMHG | OXYGEN SATURATION: 94 % | HEART RATE: 89 BPM | TEMPERATURE: 97.8 F

## 2025-02-01 VITALS — HEART RATE: 90 BPM

## 2025-02-01 RX ADMIN — ONDANSETRON HYDROCHLORIDE PRN MG: 2 INJECTION, SOLUTION INTRAMUSCULAR; INTRAVENOUS at 23:39

## 2025-02-01 RX ADMIN — DOCUSATE SODIUM PRN MG: 100 CAPSULE, LIQUID FILLED ORAL at 17:36

## 2025-02-01 NOTE — DVHPN2
Progress Note


Date Seen:  Feb 1, 2025


Medical Necessity Reason


Pt with a Central, PICC or Fol:  No





Objective


vital signs





                                   Vital Sign








  Date Time  Temp Pulse Resp B/P (MAP) Pulse Ox O2 Delivery O2 Flow Rate FiO2


 


2/1/25 10:10  84 18 131/67    


 


2/1/25 09:00 98.3    93   





 98.3       


 


1/31/25 20:00      Room Air* 0 21














                           Total Intake and Output   


 


 1/31/25 1/31/25 2/1/25





 15:00 23:00 07:00


 


Intake Total  500 ml 750 ml


 


Output Total   300 ml


 


Balance  500 ml 450 ml








medications





                               Current Medications








 Medications  Dose


 Ordered  Sig/Jennifer


 Route  Start Time


 Stop Time Status Last Admin


Dose Admin


 


 Pantoprazole


 Sodium  40 mg  DAILY


 IV  1/24/25 10:00


    2/1/25 09:39


40 MG


 


 Ondansetron HCl  4 mg  Q4HP  PRN


 IV  1/24/25 04:15


     





 


 Docusate Sodium  100 mg  BIDPRN  PRN


 PO  1/24/25 04:15


     





 


 Acetaminophen  650 mg  Q6HP  PRN


 PO  1/24/25 04:15


     





 


 Nitroglycerin  0.4 mg  Q5MINP  PRN


 SL  1/24/25 04:30


     





 


 Morphine Sulfate  2 mg  Q30M  PRN


 IV  1/24/25 04:30


     





 


 Phenol/Menthol  1 spr  Q2HP  PRN


 MT  1/25/25 13:30


     





 


 Morphine Sulfate  4 mg  Q3HP  PRN


 IV  1/26/25 11:15


    2/1/25 09:40


4 MG


 


 Potassium


 Chloride/Sodium


 Chloride  1,000 ml @ 


 100 mls/hr  Q10H


 IV  1/26/25 13:00


    2/1/25 01:33


100 MLS/HR


 


 Enoxaparin Sodium  40 mg  DAILY


 SC  1/27/25 10:00


    2/1/25 09:39


40 MG


 


 Bisacodyl  5 mg  DAILYP  PRN


 PO  1/30/25 09:30


    1/31/25 07:50


5 MG


 


 Fat Emulsion


 Intravenous 50 ml/


 Sodium Acetate 20


 meq/Potassium


 Phosphate 22 meq/


 Magnesium Sulfate


 8 meq/


 Multivitamins 10


 ml/Chromium/


 Copper/Manganese/


 Zinc 1 ml/Insulin


 Human Regular 8


 units/Amino Acids/


 Dextrose/Purified


 Water  1,278.08 ml


  @ 53 mls/hr  Q24H7M


 IV  1/30/25 22:00


 1/31/25 21:59 Cancel  





 


 Diagnostic Test


  (Pha)  1 strip  ACHS


 VI  1/30/25 22:00


    2/1/25 11:44


1 STRIP


 


 Insulin Human


 Regular    HS


 SC  1/30/25 22:00


    1/31/25 21:44


4 UNITS


 


 Insulin Human


 Regular    AC


 SC  1/30/25 17:15


    2/1/25 11:50


9 UNITS


 


 Dextrose  50 ml  UD  PRN


 IV  1/30/25 17:15


     





 


 Meropenem  50 ml @ 17


 mls/hr  Q8HR


 IV  1/31/25 14:30


    2/1/25 05:16


17 MLS/HR








laboratory and microbiology


                                Laboratory Tests


1/31/25 05:01








1/30/25 05:48

















Test


 1/31/25


05:01 Range/Units


 


 


Serum Glucose 165 H   mg/dL











Problem List/Assessment/Plan


Problem List/Assessment/Plan


1/29/25 covering for,: patient had a colostomy done at [facility]

in the past, came to this facility c/o abdominal pain and was found to have a 

necrotic colostomy with sub facsial perforation.  resected the previous 

colostomy and established a new one, which is viable and functioning (gas in the

appliance). wound clean and well approximated,  will dc NGT





1/30/25 i REMOVED THE PATIENT'S  NGT AS THE ORDER TO REMOVE IT FROM YESTERDAY 

WAS NOT FOLLOWED, ABDOMEN NON DISTENDED, NON TENDER, DRAINAGE SEROSANGUINEOUS, 

STOMA VIABLE, WILL ALLOW CLEAR LIQUIDS PO. WOUND CLEAN AND WELL APPROXIMATED





1/31/25 FEELS WELL, TOLERATING PO INTAKE, NO NAUSEA OR VOMITING, WOUND CLEAN AND

WELL APPROXIMATED, STOMA FUNCTIONING





2/1/25 doing well, stoma functioning, abdomen non distended appropriately 

tender, wound clean and well approximated, probably can discharge in am tomorrow


Plan discussed with:  Patient





Dietary Evaluation Review


Comments:  


1. Recommend TPN per pharmacy to meet 75% of pt's needs for protein and  


kcal. 





2. If EN/GI accessible, Glucerna 1.2 @50ml/hr will provide 72 gPro  


1440kcal supporting pt's needs at 80% pro and 80% of Kcal. 





3. If EN/GI accessible, PO feeding meedically feasible, may offer a  


CCHO-60 diet for DM control.


Expected Outcomes/Goals:  


improved healing and improved nutrition status, gradual weight loss.











FISCHL,PETER MD                 Feb 1, 2025 12:26

## 2025-02-01 NOTE — DVHPN2
Subjective


Patient seems him at bedside.  No complaint today.  Patient ambulate.


Reviewed:  Care Plan, H&P, Labs, Medications, Previous Orders, Radiology, Other


Changes from previous H/P or p:  No Changes





Objective


Vitals





Vital Signs








  Date Time  Temp Pulse Resp B/P (MAP) Pulse Ox O2 Delivery O2 Flow Rate FiO2


 


2/1/25 13:00 97.8 89 16 131/65 (87) 94   





 97.8       


 


2/1/25 08:00      Room Air* 0 21








Intake/Output











                               Intake and Output 


 


 2/1/25





 07:00


 


Intake Total 1250 ml


 


Output Total 300 ml


 


Balance 950 ml


 


 


 


Intake Oral 500 ml


 


IV Total 750 ml


 


Output Urine Total 300 ml








General Appearance:  Alert, Oriented X3, Cooperative, mild distress


HEENT:  Atraumatic


Lungs:  Clear to auscultation, Normal air movement


Cardiovascular:  Regular rate, Normal S1, Normal S2


Abdomen:  Other


Genitourinary:  Other


Neuro:  Normal speech, Cranial nerves 3-12 NL


Psych/Mental Status:  Mental status NL, Mood NL


Medications





                               Current Medications








 Medications  Dose


 Ordered  Sig/Jennifer


 Route  Start Time


 Stop Time Status Last Admin


Dose Admin


 


 Pantoprazole


 Sodium  40 mg  DAILY


 IV  1/24/25 10:00


    2/1/25 09:39


40 MG


 


 Ondansetron HCl  4 mg  Q4HP  PRN


 IV  1/24/25 04:15


     





 


 Docusate Sodium  100 mg  BIDPRN  PRN


 PO  1/24/25 04:15


     





 


 Acetaminophen  650 mg  Q6HP  PRN


 PO  1/24/25 04:15


     





 


 Nitroglycerin  0.4 mg  Q5MINP  PRN


 SL  1/24/25 04:30


     





 


 Morphine Sulfate  2 mg  Q30M  PRN


 IV  1/24/25 04:30


     





 


 Phenol/Menthol  1 spr  Q2HP  PRN


 MT  1/25/25 13:30


     





 


 Morphine Sulfate  4 mg  Q3HP  PRN


 IV  1/26/25 11:15


    2/1/25 09:40


4 MG


 


 Potassium


 Chloride/Sodium


 Chloride  1,000 ml @ 


 100 mls/hr  Q10H


 IV  1/26/25 13:00


    2/1/25 01:33


100 MLS/HR


 


 Enoxaparin Sodium  40 mg  DAILY


 SC  1/27/25 10:00


    2/1/25 09:39


40 MG


 


 Bisacodyl  5 mg  DAILYP  PRN


 PO  1/30/25 09:30


    1/31/25 07:50


5 MG


 


 Fat Emulsion


 Intravenous 50 ml/


 Sodium Acetate 20


 meq/Potassium


 Phosphate 22 meq/


 Magnesium Sulfate


 8 meq/


 Multivitamins 10


 ml/Chromium/


 Copper/Manganese/


 Zinc 1 ml/Insulin


 Human Regular 8


 units/Amino Acids/


 Dextrose/Purified


 Water  1,278.08 ml


  @ 53 mls/hr  Q24H7M


 IV  1/30/25 22:00


 1/31/25 21:59 Cancel  





 


 Diagnostic Test


  (Pha)  1 strip  ACHS


 VI  1/30/25 22:00


    2/1/25 11:44


1 STRIP


 


 Insulin Human


 Regular    HS


 SC  1/30/25 22:00


    1/31/25 21:44


4 UNITS


 


 Insulin Human


 Regular    AC


 SC  1/30/25 17:15


    2/1/25 11:50


9 UNITS


 


 Dextrose  50 ml  UD  PRN


 IV  1/30/25 17:15


     





 


 Meropenem  50 ml @ 17


 mls/hr  Q8HR


 IV  1/31/25 14:30


    2/1/25 14:29


17 MLS/HR











Laboratory Results


Laboratory Tests


1/30/25 05:48








1/31/25 05:01








Microbiology





                                  Microbiology








 Date/Time


Source Procedure


Growth Status





 


 1/29/25 11:45


Abdomen Gram Stain - Final


 Resulted


 


 1/29/25 11:45 Wound Culture - Preliminary


Escherichia coli


Escherichia coli - ESBL Resulted





 1/24/25 17:20


Anus Gram Stain - Final


 Complete





 1/24/25 17:20 Anaerobic Culture - Final


Prevotella loescheii


Bacteroides theitaiotaomicron Complete


 


 1/24/25 17:20 Aerobic Culture - Final


Escherichia coli


Klebsiella pneumoniae


Enterococcus  faecalis Complete


 


 1/24/25 03:40


Blood Blood Culture - Final


NO GROWTH AFTER 5 DAYS OF INCUBATION. Complete








Labs and/or images reviewed:  Labs reviewed by me





Assessment/Plan


Assessment/Plan


Sepsis due to  colonic perforation/abdominal wall cellulitis; present on 

Admission


Colonic perforation within the abdominal wall of the colostomy site likely 

secondary to necrotic colon


--> s/p Exploratory laparotomy with resection of necrotic and perforated 

colostomy with colostomy revision


Abdominal wall cellulitis--> Improved


hypokalemia


Hyponatremia


Leukocytosis


History of colon cancer status post resection 


Possible metastasis to the lungs and liver, by CT report


Diabetes mellitus


hyperglycemia


Hypertension.


Mild anemia


obesity, BMI 32.3








plan 


Antibiotics: Metronidazole and ceftriaxone 


Clear liquid diet


Replace electrolytes as necessary


Daily BMP


Surgical team following


Adequate pain medication; Morphine 4 mg q3hrs prn


Insulin moderate sliding scale


pantoprazole


To follow up with Dr. Arredondo about the new findings in lung and liver


Waiting for surgeon to cleared the patient for discharge.


Patient ambulate and tolerate diet.








This medical document was created using an electronic medical record system with

[employer] direct computerized dictation system.  Although this document has 

been carefully reviewed, there may still be some phonetic and typographical 

errors.  These areas are purely typographical due to imperfections of the 

software programs, and do not reflect any compromise in the patient's medical 

care.


Plan discussed with:  Patient





Date of Service:  Feb 1, 2025


Billing Provider:  BERNARDO SOTO MD


Common Visit Codes:  [id number] INP/OBS CARE(HIGH)











BERNARDO SOTO MD              Feb 1, 2025 14:55

## 2025-02-02 VITALS
RESPIRATION RATE: 18 BRPM | DIASTOLIC BLOOD PRESSURE: 74 MMHG | OXYGEN SATURATION: 94 % | SYSTOLIC BLOOD PRESSURE: 120 MMHG | TEMPERATURE: 97.8 F | HEART RATE: 98 BPM

## 2025-02-02 VITALS
HEART RATE: 76 BPM | DIASTOLIC BLOOD PRESSURE: 59 MMHG | OXYGEN SATURATION: 96 % | TEMPERATURE: 98.3 F | RESPIRATION RATE: 18 BRPM | SYSTOLIC BLOOD PRESSURE: 135 MMHG

## 2025-02-02 VITALS
OXYGEN SATURATION: 94 % | RESPIRATION RATE: 16 BRPM | TEMPERATURE: 98.2 F | HEART RATE: 81 BPM | DIASTOLIC BLOOD PRESSURE: 70 MMHG | SYSTOLIC BLOOD PRESSURE: 121 MMHG

## 2025-02-02 VITALS
SYSTOLIC BLOOD PRESSURE: 127 MMHG | TEMPERATURE: 98.7 F | RESPIRATION RATE: 18 BRPM | DIASTOLIC BLOOD PRESSURE: 74 MMHG | OXYGEN SATURATION: 93 % | HEART RATE: 98 BPM

## 2025-02-02 VITALS — HEART RATE: 67 BPM | RESPIRATION RATE: 18 BRPM

## 2025-02-02 VITALS
DIASTOLIC BLOOD PRESSURE: 79 MMHG | RESPIRATION RATE: 18 BRPM | TEMPERATURE: 98.2 F | HEART RATE: 77 BPM | OXYGEN SATURATION: 96 % | SYSTOLIC BLOOD PRESSURE: 142 MMHG

## 2025-02-02 VITALS — HEART RATE: 97 BPM

## 2025-02-02 VITALS
DIASTOLIC BLOOD PRESSURE: 70 MMHG | TEMPERATURE: 98.2 F | SYSTOLIC BLOOD PRESSURE: 117 MMHG | HEART RATE: 100 BPM | OXYGEN SATURATION: 93 % | RESPIRATION RATE: 16 BRPM

## 2025-02-02 LAB
ANION GAP SERPL CALCULATED.3IONS-SCNC: 9 MMOL/L (ref 5–15)
BUN SERPL-MCNC: < 5 MG/DL (ref 9–23)
BUN/CREAT SERPL: 6.8 (ref 10–20)
CALCIUM SERPL-MCNC: 10.7 MG/DL (ref 8.7–10.4)
CHLORIDE SERPL-SCNC: 104 MMOL/L (ref 98–107)
CO2 SERPL-SCNC: 21 MMOL/L (ref 20–31)
GLUCOSE SERPL-MCNC: 232 MG/DL (ref 74–106)
HCT VFR BLD AUTO: 40.9 % (ref 41–53)
HGB BLD-MCNC: 13.5 G/DL (ref 13.5–17.5)
MCH RBC QN AUTO: 28.9 PG (ref 28–32)
MCV RBC AUTO: 87.7 FL (ref 80–100)
NRBC BLD QL AUTO: 0.1 %
POTASSIUM SERPL-SCNC: 4.4 MMOL/L (ref 3.5–5.1)
SODIUM SERPL-SCNC: 134 MMOL/L (ref 136–145)

## 2025-02-02 RX ADMIN — ERTAPENEM SODIUM ONE MLS/HR: 1 INJECTION, POWDER, LYOPHILIZED, FOR SOLUTION INTRAMUSCULAR; INTRAVENOUS at 18:28

## 2025-02-02 RX ADMIN — INSULIN GLARGINE SCH UNITS: 100 INJECTION, SOLUTION SUBCUTANEOUS at 23:19

## 2025-02-02 NOTE — DVHPNRES
Progress Note


Date Seen:  2025


Resident Creating Document:  MARV LOERA RESIDENT


Medical Necessity Reason


Pt with a Central, PICC or Fol:  No





Subjective


Review of Systems


This is a 51 year old male with a past medical history of colon cancer s/p 

section in 2015 and colostomy bag, diabetes mellitus, hypertension. He presented

to [facility] ED with complaint of acute abdominal pain. Patient 

said that he has been having the pain for the past 2 weeks that is generalized 

and associated with loss of  appetite, but without nausea or vomiting.  Patient 

also mentioned that he skin around the colostomy is red and the colostomy bag 

has some amount of blood in it. Patient denied chest pain, headache, dizziness, 

diaphoresis, shortness of breath, nausea, vomiting, fever, chill. Vitals are 

temperature 99.1, pulse 111, RR:16 and Blood pressure 128/71. Laboratory data 

showed WBC 11.3, platelets 358, sodium 127, potassium 3.1, BUN 11, creatinine 

1.17, GFR 72, glucose 644, lipase 32. Abdomen/pelvis CT revealing solid nodules 

measuring 9.5 mm is seen in the left lower lobe, and enhancing lesion measuring 

10 mm seen in the right lobe of liver suggestive of hemangioma or materials; 

colostomy is noted in the left lower quadrant with dilated bowel loops seen in 

the colostomy measuring a proximally 6.3 cm, appears to be loaded with feces; 

mild fat stranding is noted in the adjacent subcutaneous fat with extraluminal 

air foci questionable for perforation. Patient started IV antibiotic regimen 

Flagyl, 





PN 2025:  Patient is seen and examined today at the bedside.  He is status 

post surgery.  He has NG tube placed draining abdominal contents. He is on pain 

medication on board.  Be 0.  Labs today showed WBC of 9.3, chloride 107 carbon 

dioxide 17 his, glucose 389.  Patient currently patient is currently on 

ceftriaxone and metronidazole and also morphine 4 mg Q 4 H p.r.n.





PN 2025: Patient seen and examined. He was lying in bed with NG tube. Still

NPO. He has no complaints. Labs value showed wbc: 8.7, K: 3.2, m.5.  rg 

dc and patient is working with PT.





PN 2025: Patient seen and examined. He was lying in bed with NG tube. Still

in pain. Incision site is clean. but old stoma site still drains some 

serosanguineous fluid. He is on morphine 4mg q3hrs prn. Labs shows Wbc  7.6, K: 

3.3, mg 1.7. He is on clinimix and PT is working with him. Will continue to 

monitor him closely. Give adequate pain medication and continue to try to get 

him out of bed.








PN 2025:  Patient seen and examine. He is mild-moderate pain.  NGT in 

place. Wound care nurse took swap from the old stoma as it was draining pus. 

Patient was seen by the surgery team and recommended TPN. I called the order 

into the pharmacy.  Patient will start tomorrow.





PN 2025:  Patient is seen and examined today.  At the time of my visit 

patient was walking with PT very much improved.  A lot better so has a mod 

painful which we given pain medication. Vitals are completely within normal 

range temperature of 98.4 heart rate of 76 respiratory of 20 and blood pressure 

136.  Lab is looking great with WBC of 6.0 hemoglobin of 11.7; chemistry of 134 

and blood sugar of to 227. All other parameters are within normal limits. 

Patient is also followed by the surgeon who has given the go ahead for the 

patient to start clear liquid diet. We will continue to monitor the patient 

closely.  Continue current medication and pain management.





PN 2025:  Patient is seen and examined today in bed denied.  Patient is 

really well has no complaints patient is walking he is eating solid food and his

incision site is also does not reveal any discharges or any redness.  Patient 

has actually been cleared by the surgeon or the surgical team to be discharged 

home.  However we are arranging home health for the patient to continue IV 

antibiotics for 14 days and also form wound care.  As soon as these are arranged

patient will be discharged home.





Objective


vital signs





                                   Vital Sign








  Date Time  Temp Pulse Resp B/P (MAP) Pulse Ox O2 Delivery O2 Flow Rate FiO2


 


25 12:09  94 18 128/64    


 


25 09:00 98.2    94   





 98.2       


 


25 08:00      Room Air* 0 21














                           Total Intake and Output   


 


 25





 15:00 23:00 07:00


 


Intake Total  850 ml 850 ml


 


Output Total  1000 ml 1825 ml


 


Balance  -150 ml -975 ml








medications





                               Current Medications








 Medications  Dose


 Ordered  Sig/Jennifer


 Route  Start Time


 Stop Time Status Last Admin


Dose Admin


 


 Pantoprazole


 Sodium  40 mg  DAILY


 IV  25 10:00


    25 08:32


40 MG


 


 Ondansetron HCl  4 mg  Q4HP  PRN


 IV  25 04:15


    25 11:38


4 MG


 


 Docusate Sodium  100 mg  BIDPRN  PRN


 PO  25 04:15


    25 17:36


100 MG


 


 Acetaminophen  650 mg  Q6HP  PRN


 PO  25 04:15


     





 


 Nitroglycerin  0.4 mg  Q5MINP  PRN


 SL  25 04:30


     





 


 Phenol/Menthol  1 spr  Q2HP  PRN


 MT  25 13:30


     





 


 Morphine Sulfate  4 mg  Q3HP  PRN


 IV  25 11:15


    25 11:39


4 MG


 


 Potassium


 Chloride/Sodium


 Chloride  1,000 ml @ 


 100 mls/hr  Q10H


 IV  25 13:00


    25 05:00


100 MLS/HR


 


 Enoxaparin Sodium  40 mg  DAILY


 SC  25 10:00


    25 08:32


40 MG


 


 Bisacodyl  5 mg  DAILYP  PRN


 PO  25 09:30


    25 07:50


5 MG


 


 Fat Emulsion


 Intravenous 50 ml/


 Sodium Acetate 20


 meq/Potassium


 Phosphate 22 meq/


 Magnesium Sulfate


 8 meq/


 Multivitamins 10


 ml/Chromium/


 Copper/Manganese/


 Zinc 1 ml/Insulin


 Human Regular 8


 units/Amino Acids/


 Dextrose/Purified


 Water  1,278.08 ml


  @ 53 mls/hr  Q24H7M


 IV  25 22:00


 25 21:59 Cancel  





 


 Diagnostic Test


  (Pha)  1 strip  ACHS


 VI  25 22:00


    25 11:39


1 STRIP


 


 Insulin Human


 Regular    HS


 SC  25 22:00


    25 23:23


4 UNITS


 


 Insulin Human


 Regular    AC


 SC  25 17:15


    25 11:39


6 UNITS


 


 Dextrose  50 ml  UD  PRN


 IV  25 17:15


     





 


 Meropenem  50 ml @ 17


 mls/hr  Q8HR


 IV  25 14:30


    25 06:42


17 MLS/HR








Examination


General Appearance:  Alert, Oriented X3, Cooperative, abdominal pain with mild 

distress, patient ambulatory


HEENT: Atraumatic, PERRLA, EOMI, Mucous membrane moist/pink


Respiratory:  Clear to auscultation, Normal air movement


Cardiovascular:  Regular rate, Normal S1, Normal S2, No murmurs, no chest wall 

tenderness


Abdominal: Status post surgery. staples noted, drains in place


Extremities:  No clubbing, No cyanosis, No edema, Normal pulses, No 

tenderness/swelling


Skin: No rashes, No breakdown, No significant lesion


Neuro: Normal gait, Normal speech, Strength at 5/5 X4 ext, Normal tone, 

Sensation intact, Cranial nerves 3-12 NL, Reflexes 2+


Psych/Mental Status:  Mental status NL, Mood NL


laboratory and microbiology


                                Laboratory Tests


25 10:19

















Test


 25


10:19 Range/Units


 


 


Serum Glucose 232 H   mg/dL








                                  Microbiology








 Date/Time


Source Procedure


Growth Status





 


 25 11:45


Abdomen Gram Stain - Final


 Complete


 


 25 11:45 Wound Culture - Final


Escherichia coli - ESBL


Escherichia coli#2


Klebsiella pneumoniae


Enterococcus  faecalis


Escherichia coli Complete





 25 17:20


Anus Gram Stain - Final


 Complete





 25 17:20 Anaerobic Culture - Final


Prevotella loescheii


Bacteroides theitaiotaomicron Complete


 


 25 17:20 Aerobic Culture - Final


Escherichia coli


Klebsiella pneumoniae


Enterococcus  faecalis Complete


 


 25 03:40


Blood Blood Culture - Final


NO GROWTH AFTER 5 DAYS OF INCUBATION. Complete











Problem List/Assessment/Plan


Problem List/Assessment/Plan


Assessment


Sepsis due to  colonic perforation/abdominal wall cellulitis; present on 

Admission


Colonic perforation within the abdominal wall of the colostomy site likely 

secondary to necrotic colon


--> s/p Exploratory laparotomy with resection of necrotic and perforated 

colostomy with colostomy revision


Abdominal wall cellulitis--> Improved


hypokalemia


Hyponatremia


Leukocytosis


History of colon cancer status post resection 


Possible metastasis to the lungs and liver, by CT report


Diabetes mellitus


hyperglycemia


Hypertension.


Mild anemia


obesity, BMI 32.3


ESBL:  positive








plan 


Antibiotics: Meropenem


Discontinued: Metronidazole and ceftriaxone 


Replace electrolytes as necessary


Daily BMP


Surgical team cleared patient for discharge


Adequate pain medication; Morphine 4 mg q3hrs prn


Insulin moderate sliding scale


Lantus: 15units at night


pantoprazole


To follow up with Dr. Arredondo about the new findings in lung and liver


Discharge plan: Patient will go home with home health  for wound care and IV 

antibioitic: Ertapenen 1mg for 14 days, 








Code status: Full


Goal of care discussed for more than 25 minutes


Case and plan discussed and reviewed with Dr. Soto


Plan discussed with:  Patient





My Orders


My Orders





                         Orders - MARV LOERA RESIDENT








Procedure Category Date Status





  Time 


 


*  CONS 25 Transmitted





Consult   











Dietary Evaluation Review


Comments:  


1. Recommend TPN per pharmacy to meet 75% of pt's needs for protein and  


kcal. 





2. If EN/GI accessible, Glucerna 1.2 @50ml/hr will provide 72 gPro  


1440kcal supporting pt's needs at 80% pro and 80% of Kcal. 





3. If EN/GI accessible, PO feeding meedically feasible, may offer a  


CCHO-60 diet for DM control.


Expected Outcomes/Goals:  


improved healing and improved nutrition status, gradual weight loss.





Date of Service:  2025


Billing Provider:  BERNARDO SOTO MD


Common Visit Codes:  16721-[id number] INP/OBS CARE(HIGH)











MARV LOERA RESIDENT           2025 14:22


BERNARDO SOTO MD              Feb 3, 2025 10:51

## 2025-02-02 NOTE — DVHPN2
Progress Note


Date Seen:  Feb 2, 2025


Medical Necessity Reason


Pt with a Central, PICC or Fol:  No





Objective


vital signs





                                   Vital Sign








  Date Time  Temp Pulse Resp B/P (MAP) Pulse Ox O2 Delivery O2 Flow Rate FiO2


 


2/2/25 11:39  92 18 132/68    


 


2/2/25 09:00 98.2    94   





 98.2       


 


2/2/25 08:00      Room Air* 0 21














                           Total Intake and Output   


 


 2/1/25 2/1/25 2/2/25





 15:00 23:00 07:00


 


Intake Total  850 ml 850 ml


 


Output Total  1000 ml 1825 ml


 


Balance  -150 ml -975 ml








medications





                               Current Medications








 Medications  Dose


 Ordered  Sig/Jennifer


 Route  Start Time


 Stop Time Status Last Admin


Dose Admin


 


 Pantoprazole


 Sodium  40 mg  DAILY


 IV  1/24/25 10:00


    2/2/25 08:32


40 MG


 


 Ondansetron HCl  4 mg  Q4HP  PRN


 IV  1/24/25 04:15


    2/2/25 11:38


4 MG


 


 Docusate Sodium  100 mg  BIDPRN  PRN


 PO  1/24/25 04:15


    2/1/25 17:36


100 MG


 


 Acetaminophen  650 mg  Q6HP  PRN


 PO  1/24/25 04:15


     





 


 Nitroglycerin  0.4 mg  Q5MINP  PRN


 SL  1/24/25 04:30


     





 


 Phenol/Menthol  1 spr  Q2HP  PRN


 MT  1/25/25 13:30


     





 


 Morphine Sulfate  4 mg  Q3HP  PRN


 IV  1/26/25 11:15


    2/2/25 11:39


4 MG


 


 Potassium


 Chloride/Sodium


 Chloride  1,000 ml @ 


 100 mls/hr  Q10H


 IV  1/26/25 13:00


    2/2/25 05:00


100 MLS/HR


 


 Enoxaparin Sodium  40 mg  DAILY


 SC  1/27/25 10:00


    2/2/25 08:32


40 MG


 


 Bisacodyl  5 mg  DAILYP  PRN


 PO  1/30/25 09:30


    1/31/25 07:50


5 MG


 


 Fat Emulsion


 Intravenous 50 ml/


 Sodium Acetate 20


 meq/Potassium


 Phosphate 22 meq/


 Magnesium Sulfate


 8 meq/


 Multivitamins 10


 ml/Chromium/


 Copper/Manganese/


 Zinc 1 ml/Insulin


 Human Regular 8


 units/Amino Acids/


 Dextrose/Purified


 Water  1,278.08 ml


  @ 53 mls/hr  Q24H7M


 IV  1/30/25 22:00


 1/31/25 21:59 Cancel  





 


 Diagnostic Test


  (Pha)  1 strip  ACHS


 VI  1/30/25 22:00


    2/2/25 11:39


1 STRIP


 


 Insulin Human


 Regular    HS


 SC  1/30/25 22:00


    2/1/25 23:23


4 UNITS


 


 Insulin Human


 Regular    AC


 SC  1/30/25 17:15


    2/2/25 11:39


6 UNITS


 


 Dextrose  50 ml  UD  PRN


 IV  1/30/25 17:15


     





 


 Meropenem  50 ml @ 17


 mls/hr  Q8HR


 IV  1/31/25 14:30


    2/2/25 06:42


17 MLS/HR








laboratory and microbiology


                                Laboratory Tests


2/2/25 10:19

















Test


 2/2/25


10:19 Range/Units


 


 


Serum Glucose 232 H   mg/dL











Problem List/Assessment/Plan


Problem List/Assessment/Plan


1/29/25 covering for,: patient had a colostomy done at [facility]

in the past, came to this facility c/o abdominal pain and was found to have a 

necrotic colostomy with sub facsial perforation.  resected the previous 

colostomy and established a new one, which is viable and functioning (gas in the

appliance). wound clean and well approximated,  will dc NGT





1/30/25 i REMOVED THE PATIENT'S  NGT AS THE ORDER TO REMOVE IT FROM YESTERDAY 

WAS NOT FOLLOWED, ABDOMEN NON DISTENDED, NON TENDER, DRAINAGE SEROSANGUINEOUS, 

STOMA VIABLE, WILL ALLOW CLEAR LIQUIDS PO. WOUND CLEAN AND WELL APPROXIMATED





1/31/25 FEELS WELL, TOLERATING PO INTAKE, NO NAUSEA OR VOMITING, WOUND CLEAN AND

WELL APPROXIMATED, STOMA FUNCTIONING





2/1/25 doing well, stoma functioning, abdomen non distended appropriately 

tender, wound clean and well approximated, probably can discharge in am tomorrow





2/2/25 feels well,wound clean,well approximated,stoma viable and 

functioning,cleared for discharge,with home health RN


Plan discussed with:  Patient





Dietary Evaluation Review


Comments:  


1. Recommend TPN per pharmacy to meet 75% of pt's needs for protein and  


kcal. 





2. If EN/GI accessible, Glucerna 1.2 @50ml/hr will provide 72 gPro  


1440kcal supporting pt's needs at 80% pro and 80% of Kcal. 





3. If EN/GI accessible, PO feeding meedically feasible, may offer a  


CCHO-60 diet for DM control.


Expected Outcomes/Goals:  


improved healing and improved nutrition status, gradual weight loss.











KANIKA ESCOBEDO MD                 Feb 2, 2025 11:52

## 2025-02-03 VITALS — HEART RATE: 90 BPM | OXYGEN SATURATION: 94 % | RESPIRATION RATE: 18 BRPM

## 2025-02-03 VITALS
DIASTOLIC BLOOD PRESSURE: 75 MMHG | OXYGEN SATURATION: 94 % | TEMPERATURE: 98.3 F | SYSTOLIC BLOOD PRESSURE: 117 MMHG | HEART RATE: 75 BPM | RESPIRATION RATE: 18 BRPM

## 2025-02-03 VITALS
OXYGEN SATURATION: 94 % | TEMPERATURE: 98 F | HEART RATE: 87 BPM | DIASTOLIC BLOOD PRESSURE: 71 MMHG | RESPIRATION RATE: 18 BRPM | SYSTOLIC BLOOD PRESSURE: 121 MMHG

## 2025-02-03 VITALS
OXYGEN SATURATION: 94 % | RESPIRATION RATE: 18 BRPM | TEMPERATURE: 98.6 F | DIASTOLIC BLOOD PRESSURE: 59 MMHG | HEART RATE: 93 BPM | SYSTOLIC BLOOD PRESSURE: 116 MMHG

## 2025-02-03 VITALS
SYSTOLIC BLOOD PRESSURE: 116 MMHG | DIASTOLIC BLOOD PRESSURE: 59 MMHG | HEART RATE: 93 BPM | OXYGEN SATURATION: 94 % | TEMPERATURE: 98.6 F | RESPIRATION RATE: 18 BRPM

## 2025-02-03 VITALS
OXYGEN SATURATION: 90 % | DIASTOLIC BLOOD PRESSURE: 66 MMHG | SYSTOLIC BLOOD PRESSURE: 115 MMHG | TEMPERATURE: 98.2 F | RESPIRATION RATE: 18 BRPM | HEART RATE: 84 BPM

## 2025-02-03 LAB
ANION GAP SERPL CALCULATED.3IONS-SCNC: 8 MMOL/L (ref 5–15)
BUN SERPL-MCNC: 7 MG/DL (ref 9–23)
BUN/CREAT SERPL: 9.9 (ref 10–20)
CALCIUM SERPL-MCNC: 10.2 MG/DL (ref 8.7–10.4)
CHLORIDE SERPL-SCNC: 104 MMOL/L (ref 98–107)
CO2 SERPL-SCNC: 25 MMOL/L (ref 20–31)
GLUCOSE SERPL-MCNC: 107 MG/DL (ref 74–106)
POTASSIUM SERPL-SCNC: 4.3 MMOL/L (ref 3.5–5.1)
SODIUM SERPL-SCNC: 137 MMOL/L (ref 136–145)

## 2025-02-03 RX ADMIN — ERTAPENEM SODIUM SCH MLS/HR: 1 INJECTION, POWDER, LYOPHILIZED, FOR SOLUTION INTRAMUSCULAR; INTRAVENOUS at 10:18

## 2025-02-03 NOTE — DVHPN2
Progress Note


Date Seen:  Feb 3, 2025


Medical Necessity Reason


Pt with a Central, PICC or Fol:  No





Subjective


Patient reports:  No new complaints, Feels better


Review of Systems:  HEENT:Normal, CVS:Normal, RESPIRATORY:Normal, GI:Normal, 

:Normal, MSK:Normal, NEURO:Normal





Objective


vital signs





                                   Vital Sign








  Date Time  Temp Pulse Resp B/P (MAP) Pulse Ox O2 Delivery O2 Flow Rate FiO2


 


2/3/25 05:00 98.3 75 18 117/75 (89) 94   





 98.3       


 


2/2/25 20:00      Room Air* 0 21














                           Total Intake and Output   


 


 2/2/25 2/2/25 2/3/25





 15:00 23:00 07:00


 


Intake Total 518 ml 544 ml 1050 ml


 


Output Total  551 ml 


 


Balance 518 ml -7 ml 1050 ml








medications





                               Current Medications








 Medications  Dose


 Ordered  Sig/Jennifer


 Route  Start Time


 Stop Time Status Last Admin


Dose Admin


 


 Pantoprazole


 Sodium  40 mg  DAILY


 IV  1/24/25 10:00


    2/2/25 08:32


40 MG


 


 Ondansetron HCl  4 mg  Q4HP  PRN


 IV  1/24/25 04:15


    2/2/25 11:38


4 MG


 


 Docusate Sodium  100 mg  BIDPRN  PRN


 PO  1/24/25 04:15


    2/1/25 17:36


100 MG


 


 Acetaminophen  650 mg  Q6HP  PRN


 PO  1/24/25 04:15


     





 


 Nitroglycerin  0.4 mg  Q5MINP  PRN


 SL  1/24/25 04:30


     





 


 Phenol/Menthol  1 spr  Q2HP  PRN


 MT  1/25/25 13:30


     





 


 Morphine Sulfate  4 mg  Q3HP  PRN


 IV  1/26/25 11:15


    2/2/25 18:17


4 MG


 


 Potassium


 Chloride/Sodium


 Chloride  1,000 ml @ 


 100 mls/hr  Q10H


 IV  1/26/25 13:00


    2/3/25 01:00


100 MLS/HR


 


 Enoxaparin Sodium  40 mg  DAILY


 SC  1/27/25 10:00


    2/2/25 08:32


40 MG


 


 Bisacodyl  5 mg  DAILYP  PRN


 PO  1/30/25 09:30


    1/31/25 07:50


5 MG


 


 Fat Emulsion


 Intravenous 50 ml/


 Sodium Acetate 20


 meq/Potassium


 Phosphate 22 meq/


 Magnesium Sulfate


 8 meq/


 Multivitamins 10


 ml/Chromium/


 Copper/Manganese/


 Zinc 1 ml/Insulin


 Human Regular 8


 units/Amino Acids/


 Dextrose/Purified


 Water  1,278.08 ml


  @ 53 mls/hr  Q24H7M


 IV  1/30/25 22:00


 1/31/25 21:59 Cancel  





 


 Diagnostic Test


  (Pha)  1 strip  ACHS


 VI  1/30/25 22:00


    2/3/25 08:01


1 STRIP


 


 Insulin Human


 Regular    HS


 SC  1/30/25 22:00


    2/2/25 23:20


2 UNITS


 


 Insulin Human


 Regular    AC


 SC  1/30/25 17:15


    2/3/25 08:02


2 UNITS


 


 Dextrose  50 ml  UD  PRN


 IV  1/30/25 17:15


     





 


 Insulin Glargine  15 units  HS


 SC  2/2/25 22:00


    2/2/25 23:19


15 UNITS


 


 Ertapenem 1 gm/


 Sodium Chloride  50 ml @ 


 100 mls/hr  DAILY


 IV  2/3/25 10:00


     











Examination:  GENERAL:Normal, HEENT:Normal, NECK:Normal, LUNGS:Normal, 

CVS:Normal, ABDOMEN:Normal, MSK:Normal, SKIN:Normal, NEURO:Normal


laboratory and microbiology


                                Laboratory Tests


2/3/25 04:33








2/2/25 10:19

















Test


 2/3/25


04:33 Range/Units


 


 


Serum Glucose 107 #H   mg/dL











Problem List/Assessment/Plan


Problem List/Assessment/Plan


2/3/25 no new complaints, wound clean dry and staples intact, stoma viable, 

abdomen soft, non distended, non tender, ok for discharge with home health RN


Plan discussed with:  Patient





Dietary Evaluation Review


Comments:  


1. Recommend TPN per pharmacy to meet 75% of pt's needs for protein and  


kcal. 





2. If EN/GI accessible, Glucerna 1.2 @50ml/hr will provide 72 gPro  


1440kcal supporting pt's needs at 80% pro and 80% of Kcal. 





3. If EN/GI accessible, PO feeding meedically feasible, may offer a  


CCHO-60 diet for DM control.


Expected Outcomes/Goals:  


improved healing and improved nutrition status, gradual weight loss.











IDALIA TOBAR NP               Feb 3, 2025 08:52 WRITER ANSWERED CALL BELL. PT ON BED HOOD. VOIDED CLEAR YELLOW URINE. ASSISTED
BY [facility]. PT NOT RECEPTIVE TO GETTING OOB TO VOID, IS USE OR TAKING A
SHOWER LATER TODAY.PT PLAN OF CARE FOR TODAY EXPLAINED TO PT. PT VOICES
UNDERSTANDING WITH HESITANCY. PT CAN BARELY KEEP HER EYES OPEN WHEN SPEAKING
WITH YOU. VS STABLE AT THIS TIME. READIED FOR BREAKFAST. WILL CONTINUE TO
MONITOR. CALL BELL IN REACH.

## 2025-02-03 NOTE — DVHDSRES
Discharge Summary


Date of Admission


Resident Creating Document:  MARV LOERA RESIDENT


2025 at 04:28





Date of Discharge:


Feb 3, 2025





Admitting Diagnosis


Acute abdominal pain





Labs/Diagnostic Data:








PATIENT: CLAUDIO SCHOFIELD    ACCT: [id number]        UNIT: [id number]


: 1965           LOC: ER                   ROOM / BED:  / 


AGE / SEX: 59 / M         ADM STATUS: REG ER        SERVICE DT: 25





ORDERING PHYSICIAN: PAUL PATEL MD


PROCEDURE(s): CXRP - CHEST PORTABLE


REASON: abdominal pain, ca hx, ostomy


ORDER NUMBER(s): [id number], ACCESSION NUMBER(s): [id number]








EXAM: XY CHEST PORTABLE





CLINICAL HISTORY: abdominal pain, ca hx, ostomy





TECHNIQUE: Single AP view of the chest





WID: 





COMPARISON: XY CHEST PORTABLE on DOS: 23





FINDINGS: 





Lines and tubes: Right IJ chest port with the tip projecting over the mid SVC.





Chest: 





The heart size and pulmonary vasculature is within normal limits.





No pleural effusion, pneumothorax, or consolidation. Linear scarring or 

atelectasis in the medial right lung base.





The osseous structures are grossly intact.





IMPRESSION: 





No acute cardiopulmonary abnormality.





Electronically Signed by: Mariah Scherer at 2025 01:08:45 AM











DICTATED BY: TIMOTHY SCHERER MD


DICTATED DATE/TIME: 25 0108

















PATIENT: CLAUDIO SCHOFIELD    ACCT: [id number]        UNIT: [id number]


: 1965           LOC: TELE                 ROOM / BED: [address] [place] [address]


AGE / SEX: 59 / M         ADM STATUS: ADM IN        SERVICE DT: 25





ORDERING PHYSICIAN: PAUL PATEL MD


PROCEDURE(s): ABPLIV - CT AB PEL WITH IV CON ONLY


REASON: abdominal pain, ca hx, ostomy


ORDER NUMBER(s): [id number], ACCESSION NUMBER(s): [id number]








               -------------------- ADDENDUM --------------------








Addendum - Date: 2025 7:45:00 AM


On: 2025 4:52:00 AM Critical finding communicated verbally to Dr Chantell Sinha.

















Critical Finding:





Examination:  ABPLIV





CLINICAL INDICATION:  ;abdominal pain, ca hx, ostomy





COMPARISON:  None.





CONTRAST USED:  Intravenous.





TECHNIQUE:  A contrast CT study of the abdomen and pelvis is performed.  The 

examination was performed with 5 mm thin slices.  Multiplanar reconstructions 

were obtained.  CT scan done according to ALARA (As Low As Reasonably 

Achievable).





FINDINGS:





Lung base:  Solid nodule measuring 9.5 mm is seen in the left lower lobe.





Small sliding hiatus hernia.





Subsegmental atelectasis is seen in the lingula.





Liver:  The liver is normal in size.  An enhancing lesion measuring 10 mm is 

seen in the right lobe of liver suggestive of hemangioma or metastasis.  

Subcentimeter non-enhancing cyst is seen in segment VI of liver.  It is likely 

benign and requires no follow-up.  The portal venous radicles are normal.  There

is no intrahepatic biliary radicle dilatation.





Gallbladder:  The gallbladder is not visualized (postoperative status).  The 

common bile duct is not dilated.





Pancreas:  The pancreas is normal in size and shape.  No focal lesion is seen 

within.  The peripancreatic fa -planes are normal.





Spleen:  The spleen is normal in size and does not show any focal abnormality.





Retroperitoneum:  Both adrenal glands are normal in size and morphology.





There is no significant retroperitoneal lymphadenopathy.





The kidneys are normal in size with no hydronephrosis or renal calculi.





Vessels:  Aorta, IVC and the mesenteric vessels appear normal.





Stomach and bowel:  The bowel loops are unremarkable.  There is no ascites.





Skeletal system:  Degenerative changes are seen involving the spine.





Grade 1 anterolisthesis of L5 on S1 vertebra is seen with bilateral lysis.





CT PELVIS:





Appendix:  The appendix is unremarkable in appearance.





Colon:  Colostomy is noted in the left lower quadrant with dilated bowel loops 

seen in the colostomy measuring approximately 6.3 cm.  It appears to be loaded 

with feces.  Mild fat stranding is noted in the adjacent subcutaneous fat with 

extraluminal air foci questionable for perforation.





Bladder:  The urinary bladder is unremarkable.





Prostate appears normal.





No abnormal fluid collection is seen.





No pelvic lymphadenopathy is identified.





IMPRESSION:





1.  Solid nodule measuring 9.5 mm is seen in the left lower lobe.  Suggest 

further evaluation with CT chest study or PET CT study.





2.  An enhancing lesion measuring 10 mm is seen in the right lobe of liver 

suggestive of hemangioma or metastasis.





3.  Colostomy is noted in the left lower quadrant with dilated bowel loops seen 

in the colostomy measuring approximately 6.3 cm.  It appears to be loaded with 

feces.  Mild fat stranding is noted in the adjacent subcutaneous fat with 

extraluminal air foci questionable for perforation.





4.  No abdominal mass or adenopathy.





5.  No ascites.





6.  No free air or inflammatory changes.





7.  Additional chronic and/or ancillary findings as detailed above.





8.  Suggest clinical correlation and follow-up as clinically deemed necessary.


Electronically Signed


2025 03:23


Yoel Edmond





Electronically Signed by: Mayito Navarro at 2025 07:35:00 AM





DICTATED BY: MAYITO NAVARRO MD


DICTATED DATE/TIME: 25





SIGNED BY: MAYITO NAVARRO MD


SIGNED DATE/TIME: 25





CC: 


Critical Finding:





Examination:  ABPLIV





CLINICAL INDICATION:  ;abdominal pain, ca hx, ostomy





COMPARISON:  None.





CONTRAST USED:  Intravenous.





TECHNIQUE:  A contrast CT study of the abdomen and pelvis is performed.  The 

examination was performed with 5 mm thin slices.  Multiplanar reconstructions 

were obtained.  CT scan done according to ALARA (As Low As Reasonably 

Achievable).





FINDINGS:





Lung base:  Solid nodule measuring 9.5 mm is seen in the left lower lobe.





Small sliding hiatus hernia.





Subsegmental atelectasis is seen in the lingula.





Liver:  The liver is normal in size.  An enhancing lesion measuring 10 mm is 

seen in the right lobe of liver suggestive of hemangioma or metastasis.  

Subcentimeter non-enhancing cyst is seen in segment VI of liver.  It is likely 

benign and requires no follow-up.  The portal venous radicles are normal.  There

is no intrahepatic biliary radicle dilatation.





Gallbladder:  The gallbladder is not visualized (postoperative status).  The 

common bile duct is not dilated.





Pancreas:  The pancreas is normal in size and shape.  No focal lesion is seen 

within.  The peripancreatic fa -planes are normal.





Spleen:  The spleen is normal in size and does not show any focal abnormality.





Retroperitoneum:  Both adrenal glands are normal in size and morphology.





There is no significant retroperitoneal lymphadenopathy.





The kidneys are normal in size with no hydronephrosis or renal calculi.





Vessels:  Aorta, IVC and the mesenteric vessels appear normal.





Stomach and bowel:  The bowel loops are unremarkable.  There is no ascites.





Skeletal system:  Degenerative changes are seen involving the spine.





Grade 1 anterolisthesis of L5 on S1 vertebra is seen with bilateral lysis.





CT PELVIS:





Appendix:  The appendix is unremarkable in appearance.





Colon:  Colostomy is noted in the left lower quadrant with dilated bowel loops 

seen in the colostomy measuring approximately 6.3 cm.  It appears to be loaded 

with feces.  Mild fat stranding is noted in the adjacent subcutaneous fat with 

extraluminal air foci questionable for perforation.





Bladder:  The urinary bladder is unremarkable.





Prostate appears normal.





No abnormal fluid collection is seen.





No pelvic lymphadenopathy is identified.





IMPRESSION:





1.  Solid nodule measuring 9.5 mm is seen in the left lower lobe.  Suggest 

further evaluation with CT chest study or PET CT study.





2.  An enhancing lesion measuring 10 mm is seen in the right lobe of liver 

suggestive of hemangioma or metastasis.





3.  Colostomy is noted in the left lower quadrant with dilated bowel loops seen 

in the colostomy measuring approximately 6.3 cm.  It appears to be loaded with 

feces.  Mild fat stranding is noted in the adjacent subcutaneous fat with 

extraluminal air foci questionable for perforation.





4.  No abdominal mass or adenopathy.





5.  No ascites.





6.  No free air or inflammatory changes.





7.  Additional chronic and/or ancillary findings as detailed above.





8.  Suggest clinical correlation and follow-up as clinically deemed necessary.


Electronically Signed


2025 03:23


Yoel Edmond





Electronically Signed by: Mayito Navarro at 2025 03:23:00 AM





DICTATED BY: MAYITO NAVARRO MD


DICTATED DATE/TIME: 25 0323























                               Laboratory Results








Test


 2/3/25


10:46 2/3/25


04:33 25


10:19 25


05:48


 


POC Glucose


 242 mg/dl


() 


 


 





 


Sodium Level


 


 137 mmol/L


(136-145) 


 





 


Potassium Level


 


 4.3 mmol/L


(3.5-5.1) 


 





 


Chloride Level


 


 104 mmol/L


() 


 





 


Carbon Dioxide Level


 


 25 mmol/L


(20-31) 


 





 


Anion Gap  8 (5-15)   


 


Blood Urea Nitrogen  7 mg/dL (9-23)   


 


Creatinine


 


 0.71 mg/dL


(0.700-1.30) 


 





 


Glomerular Filtration Rate


Calc 


 106 mL/min


(>90) 


 





 


BUN/Creatinine Ratio


 


 9.9


(10.0-20.0) 


 





 


Serum Glucose


 


 107 mg/dL


() 


 





 


Calcium Level


 


 10.2 mg/dL


(8.7-10.4) 


 





 


White Blood Count


 


 


 7.9 10^3/uL


(4.4-10.8) 





 


Red Blood Count


 


 


 4.66 10^6/uL


(4.5-5.90) 





 


Hemoglobin


 


 


 13.5 g/dL


(13.5-17.5) 





 


Hematocrit


 


 


 40.9 %


(41.0-53.0) 





 


Mean Corpuscular Volume


 


 


 87.7 fL


(80.0-100.0) 





 


Mean Corpuscular Hemoglobin


 


 


 28.9 pg


(28.0-32.0) 





 


Mean Corpuscular Hemoglobin


Concent 


 


 33.0 g/dL


(32.0-36.0) 





 


Red Cell Distribution Width


 


 


 14.6 %


(11.8-14.3) 





 


Platelet Count


 


 


 487 10^3/uL


(140-450) 





 


Mean Platelet Volume


 


 


 6.3 fL


(6.9-10.8) 





 


Neutrophils (%) (Auto)


 


 


 78.1 %


(37.0-80.0) 





 


Lymphocytes (%) (Auto)


 


 


 13.8 %


(10.0-50.0) 





 


Monocytes (%) (Auto)


 


 


 7.0 %


(0.0-12.0) 





 


Eosinophils (%) (Auto)


 


 


 0.9 %


(0.0-7.0) 





 


Basophils (%) (Auto)


 


 


 0.2 %


(0.0-2.0) 





 


Neutrophils # (Auto)


 


 


 6.2 10 ^3/uL


(1.6-8.6) 





 


Lymphocytes # (Auto)


 


 


 1.1 10 ^3/uL


(0.4-5.4) 





 


Monocytes # (Auto)


 


 


 0.6 10 ^3/uL


(0-1.3) 





 


Eosinophils # (Auto)


 


 


 0.1 10 ^3/uL


(0-0.8) 





 


Basophils # (Auto)


 


 


 0 10 ^3/uL


(0-0.2) 





 


Nucleated Red Blood Cells   0.1 %  


 


Hemoglobin A1c


 


 


 10.1 % A1C


(<5.7) 





 


Phosphorus Level


 


 


 


 2.6 mg/dL


(2.4-5.1)


 


Magnesium Level


 


 


 


 1.8 mg/dL


(1.6-2.6)


 


Total Bilirubin


 


 


 


 0.3 mg/dL


(0.2-1.0)


 


Aspartate Amino Transferase


(AST) 


 


 


 26 U/L (13-40) 





 


Alanine Aminotransferase (ALT)    9 U/L (7-40) 


 


Alkaline Phosphatase


 


 


 


 71 U/L


()


 


Total Protein


 


 


 


 5.7 g/dL


(5.7-8.2)


 


Albumin


 


 


 


 3.4 g/dL


(3.2-4.8)


 


Test


 25


05:50 25


05:45 25


05:38 25


03:40


 


Triglycerides Level


 83 mg/dL (<


150) 


 


 





 


Platelet Estimate  Adequate   


 


Prothrombin Time


 


 


 10.9 sec


(9.3-11.8) 





 


Prothrombin Time INR


 


 


 1.03


(0.9-1.15) 





 


Activated Partial


Thromboplast Time 


 


 30.2 SEC


(24.5-34.5) 





 


Lactic Acid Level


 


 


 


 1.9 mmol/L


(0.4-2.0)


 


Test


 25


23:40 


 


 





 


Lipase 32 U/L (12-53)    





                             Other Laboratory Tests


2/3/25 04:33








25 10:19











Brief Hx & Hospital Course:


Brief History and Hospital course





This 51 year old male with a past medical history of colon cancer s/p section in

2015 and colostomy bag, diabetes mellitus, hypertension. He presented to [facility] ED with complaint of acute abdominal pain. Patient said that he 

has been having the pain for 2 weeks  prior to presentation to the ED. 


with an associated decreased appetite, but without nausea or vomiting.  Patient 

also mentioned that he skin around the colostomy is red and and blood in the  

colostomy bag. Vitals data revealed  temperature 99.1, pulse 111, RR:16 and 

Blood pressure 128/71. Laboratory data showed WBC 11.3, platelets 358, sodium 

127, potassium 3.1, BUN 11, creatinine 1.17, GFR 72, glucose 644, lipase 32. 

Abdomen/pelvis CT revealing solid nodules measuring 9.5 mm is seen in the left 

lower lobe, and enhancing lesion measuring 10 mm seen in the right lobe of liver

suggestive of hemangioma or materials; colostomy is noted in the left lower 

quadrant with dilated bowel loops seen in the colostomy measuring a proximally 

6.3 cm, appears to be loaded with feces; mild fat stranding is noted in the 

adjacent subcutaneous fat with extraluminal air foci questionable for 

perforation. Patient started IV antibiotic regimen Flagyl and surgeon consult. 





Hospitals course


Seen by the surgeon on the same day and taken to the OR for Exploratory 

laparotomy with resection of necrotic and perforated colostomy with colostomy 

revision.  Surgery went well without any complications patient remain on 

Metronidazole and Ceftriaxone. Patient was kept NPO for few days to allow the 

wound to heal. Whiles on NPO, patient received Clinimix for couple of days and 

transitioned to clear fluid diet. Diet was gradually advanced and prior to 

discharge, patient was on diabetic diet. He also had few sessions with PT.  

Would culture grew ESBL.  Antibiotics switched to meropenem and later was 

changed to ertapenem 1 gm daily.  Patient tolerated the antibiotics very well 

and overall is stable.  Patient can continue antibiotics treatment at home 

therefore we sent him home with home health.  For IV antibiotics and for wound 

care. Patient advised to follow up with pcp, oncologist and the surgery.





Examination


General Appearance:  Alert, Oriented X3, Cooperative, abdominal pain with mild 

distress, patient ambulatory


HEENT: Atraumatic, PERRLA, EOMI, Mucous membrane moist/pink


Respiratory:  Clear to auscultation, Normal air movement


Cardiovascular:  Regular rate, Normal S1, Normal S2, No murmurs, no chest wall 

tenderness


Abdominal: Status post surgery. staples noted, drains in place


Extremities:  No clubbing, No cyanosis, No edema, Normal pulses, No 

tenderness/swelling


Skin: No rashes, No breakdown, No significant lesion


Neuro: Normal gait, Normal speech, Strength at 5/5 X4 ext, Normal tone, 

Sensation intact, Cranial nerves 3-12 NL, Reflexes 2+


Psych/Mental Status:  Mental status NL, Mood NL





Diagnoses


Sepsis due to  colonic perforation/abdominal wall cellulitis; present on 

Admission


Colonic perforation within the abdominal wall of the colostomy site likely 

secondary to necrotic colon


Abdominal wall cellulitis


hypokalemia


Hyponatremia


Leukocytosis


History of colon cancer status post resection 


Possible metastasis to the lungs and liver, by CT report


Diabetes mellitus


hyperglycemia


Hypertension.


Mild anemia


obesity, BMI 32.3


Wound culture ESBL:  positive





Discharge planing


Discharged home in a stable condition.


Home with home health with antibiotic and wound care.


IV antibioitic: Ertapenen 1mg for 14 days, 


Pain control


Continue Lantus: 15units at night


Pantoprazole


Continue home medications


Advised to follow up with Dr. Arredondo about the new findings in lung and liver


Follow up the Discharge clinic in a 7 days


Follow up with surgery





Discharge plan discussed with DR. Soto





Consults/Reason for consult


pain at the colostomy site with some bleeding.





Operations or Procedures


Operative Report - 2


Report Details


Date:


25


Preop Diagnosis:


1. Necrotic colostomy with perforation


Postop Diagnosis:  


1. Same


Surgeon:


Anika Monterroso MD


Assistant:


None


Anesthesiologist:


Adriel Washington CRNA


Anesthesia:  General


Drains:


15 Gibraltarian Rakan x 2


Consent:


The surgery and its risks including but not limited to infection, bleeding 

requiring possible blood transfusion with the risk of hepatitis or HIV 

infection, possible perioperative MI or stroke were explained to the patient and

his wife.  All questions were answered to their satisfaction.  He expressed 

verbal understanding and wished to proceed with the surgery.


Complications:


None


Estimated Blood Loss:


100 mL


Fluids:


3 L


Findings:


Perforation with fecal contamination in the abdominal wall at the colostomy site





Name of Procedure Performed


Exploratory laparotomy with resection of necrotic and perforated colostomy with 

colostomy revision





Procedure Details


Procedure Details:


After induction of general anesthesia, a Emerson catheter was placed.  Patient's 

colostomy opening was then suture closed using 2-0 Vicryl sutures.  His abdomen 

including the colostomy site was then prepped and draped in standard surgical 

fashion.  Midline incision was made and this incision was taken through the 

abdominal wall down to the fascia which was opened using electrocautery.  There 

was no obvious abscess in the intra-abdominal cavity relatively small amount of 

adhesions.  These were all taken down and the dissection proceeded laterally to 

the left to the colostomy site.  Once the colon involving the colostomy site was

completely freed up, the colostomy was then detached from the skin.  Once we got

into the subcutaneous tissue, there fecal material contaminating the soft tissue

in this area.  An attempt was made to control this by stapling the colon and 

dividing the colon in the abdominal cavity from the portion of the colon 

involving the the abdominal wall.  Even then due to the diffuse dilatation of 

the colon within the abdominal wall, it took some time to clear out all the 

feces from this area.  The colon was then completely  and resected from

the abdominal wall and sent off the surgical field.  The fascial defect was then

closed using interrupted 1. Ethibond sutures.  Surgical site was then well 

irrigated with diluted Betadine irrigation.  Penrose drain was then placed into 

this abdominal wall defect and overlying soft tissue was reapproximated using 

interrupted 2-0 Vicryl sutures.  The skin was left partially open.  Minimal 

dissection was performed to free up the descending colon and was noted the 

patient had still somewhat of a tortuous colon and the colon involved with the 

colostomy site the appeared to be the sigmoid colon.  A small circular incision 

was made in the left upper quadrant and this incision was taken through the 

abdominal wall down to the fascia.  A cruciate incision was made at the fascia 

big enough to fit 3-0 my fingers easily.  Descending colon stump was then easily

placed through this opening without twisting of the mesentery.  2-0 Vicryl 

sutures were used to secure the mesocolon to the peritoneum to prevent any 

slippage.  Abdominal cavity was then irrigated with approximately 6 L of warm 

Ancef irrigation.  A 15 [place] Rakan drain was placed along the left gutter and 

brought out through a separate stab incision in the left lower quadrant and 

secured to the skin using 3-0 nylon sutures.  A 2nd 15 [place] Rakan drain was 

placed into the pelvis and brought out through stab incision in the right lower 

quadrant and secured to the skin using 3-0 nylon sutures.  Midline fascia was 

then closed using running looped 0 PDS sutures with interrupted 1. Ethibond 

sutures.  Surgical site was well irrigated again and skin incision was then 

closed using staples.  Colostomy was then matured by taking the in the staple 

line.  There was healthy bleeding in the mucosa appeared pink and viable.  I 

gently explored the lumen and there was no narrowing at the fascial level.  The 

colostomy was then matured using 2-0 and 3-0 Vicryl sutures.  Surgical sites 

were cleaned and dried and dressings were applied.  Sponge, needle, instrument 

count at the end of the case were reported to be correct by the nursing staff.  

Patient tolerated procedure well and was awakened, extubated and transferred to 

recovery in stable condition.


Specimen:


Sigmoid colon at the colostomy site





Condition


Stable





Disposition


2





 Still a Patient

















ANIKA MONTERROSO MD                 2025 18:22





DICTATED BY:ANIKA MONTERROSO MD


DICTATED DATE/TIME:25





Condition at Discharge:


Good





Final Diagnosis/Problems List





Sepsis due to  colonic perforation/abdominal wall cellulitis; present on


Admission


Colonic perforation within the abdominal wall of the colostomy site likely


secondary to necrotic colon


--> s/p Exploratory laparotomy with resection of necrotic and perforated


colostomy with colostomy revision


Abdominal wall cellulitis--> Improved


hypokalemia


Hyponatremia


Leukocytosis


History of colon cancer status post resection 


Possible metastasis to the lungs and liver, by CT report


Diabetes mellitus


hyperglycemia


Hypertension.


Mild anemia


obesity, BMI 32.3


ESBL:  positive





Discharge Disposition:


Home with Health Services


Discharge Statement:


"Patient was advised to return to the ER or call 911 if any headaches, 

dizziness, shortness of breath, chest pain, abdominal pain, bleeding, fevers, or

worsening of medical condition.





Patient was counseled about treatment plan, medications, possible side effects, 

patientverbalized understanding. All questions were answered to the best of my 

ability.





This discharge took greater then 30 minutes in planning, reviewing 

documentation, counseling the patient, and discussing with other team members."





ASSESSMENT


1. Same





Date of Service:  Feb 3, 2025


Billing Provider:  BERNARDO SOTO MD


Common Visit Codes:  07223-MDU/OBS DISCH DAY >30min











MARV LOERA RESIDENT           Feb 3, 2025 13:26


BERNARDO SOTO MD              2025 18:23

## 2025-06-09 ENCOUNTER — HOSPITAL ENCOUNTER (EMERGENCY)
Dept: HOSPITAL 15 - ER | Age: 60
Discharge: HOME | End: 2025-06-09
Payer: COMMERCIAL

## 2025-06-09 VITALS
SYSTOLIC BLOOD PRESSURE: 137 MMHG | DIASTOLIC BLOOD PRESSURE: 80 MMHG | OXYGEN SATURATION: 98 % | TEMPERATURE: 98 F | RESPIRATION RATE: 18 BRPM | HEART RATE: 88 BPM

## 2025-06-09 VITALS — WEIGHT: 182.98 LBS | BODY MASS INDEX: 30.49 KG/M2 | HEIGHT: 65 IN

## 2025-06-09 DIAGNOSIS — Z90.49: ICD-10-CM

## 2025-06-09 DIAGNOSIS — I10: ICD-10-CM

## 2025-06-09 DIAGNOSIS — E11.9: ICD-10-CM

## 2025-06-09 DIAGNOSIS — Z79.899: ICD-10-CM

## 2025-06-09 DIAGNOSIS — M23.92: Primary | ICD-10-CM

## 2025-06-09 PROCEDURE — 96372 THER/PROPH/DIAG INJ SC/IM: CPT

## 2025-06-09 PROCEDURE — 99285 EMERGENCY DEPT VISIT HI MDM: CPT

## 2025-06-09 PROCEDURE — 93005 ELECTROCARDIOGRAM TRACING: CPT

## 2025-06-09 PROCEDURE — 93971 EXTREMITY STUDY: CPT

## 2025-06-09 RX ADMIN — KETOROLAC TROMETHAMINE ONE MG: 30 INJECTION, SOLUTION INTRAMUSCULAR; INTRAVENOUS at 13:34

## 2025-06-09 RX ADMIN — HYDROCODONE BITARTRATE AND ACETAMINOPHEN ONE TAB: 5; 325 TABLET ORAL at 13:34
